# Patient Record
Sex: FEMALE | Race: WHITE | URBAN - METROPOLITAN AREA
[De-identification: names, ages, dates, MRNs, and addresses within clinical notes are randomized per-mention and may not be internally consistent; named-entity substitution may affect disease eponyms.]

---

## 2022-10-20 ENCOUNTER — TELEPHONE (OUTPATIENT)
Dept: SURGERY | Age: 67
End: 2022-10-20

## 2022-10-20 ENCOUNTER — OFFICE VISIT (OUTPATIENT)
Dept: SURGERY | Age: 67
End: 2022-10-20
Payer: MEDICARE

## 2022-10-20 ENCOUNTER — DOCUMENTATION ONLY (OUTPATIENT)
Dept: SURGERY | Age: 67
End: 2022-10-20

## 2022-10-20 VITALS — WEIGHT: 145 LBS | BODY MASS INDEX: 24.75 KG/M2 | HEIGHT: 64 IN

## 2022-10-20 DIAGNOSIS — C50.411 MALIGNANT NEOPLASM OF UPPER-OUTER QUADRANT OF RIGHT FEMALE BREAST, UNSPECIFIED ESTROGEN RECEPTOR STATUS (HCC): Primary | ICD-10-CM

## 2022-10-20 PROCEDURE — 99204 OFFICE O/P NEW MOD 45 MIN: CPT | Performed by: SURGERY

## 2022-10-20 PROCEDURE — 1123F ACP DISCUSS/DSCN MKR DOCD: CPT | Performed by: SURGERY

## 2022-10-20 PROCEDURE — 76642 ULTRASOUND BREAST LIMITED: CPT | Performed by: SURGERY

## 2022-10-20 RX ORDER — LISINOPRIL 20 MG/1
20 TABLET ORAL
COMMUNITY

## 2022-10-20 RX ORDER — CELECOXIB 200 MG/1
CAPSULE ORAL
COMMUNITY
Start: 2022-08-24 | End: 2022-10-25

## 2022-10-20 NOTE — PROGRESS NOTES
HISTORY OF PRESENT ILLNESS  Natasha Mcfadden is a 75 y.o. female. HPI NEW patient consult referred by Dr. Enrqiue Morin for RIGHT breast cancer found on screening mammogram.  She had stage 1 LEFT breast cancer 12 years ago. Her breast is bruised after the biopsy. Moh's surgery 10/2022  Pterygium removed 2022 (before cataract surgery)  Cataract surgery 2022 LEFT lumpectomy 9mm tubular invasive ductal carcinoma, grade 1, SN-, %. OR neg, Her 2 -,  XRT. Tamoxifen x 2 yrs     10/2022 RIGHT UOQ, invasive ductal carcinoma, grade 2, ER %. PR51-60%, Her 2 equivocal, ki  *%    Family History:   Maternal aunt had breast cancer age 54    [facility] Results (most recent):  Results from Abstract encounter on 10/18/22    [facility] MAMMOGRAM SCREENING BILATERAL   [address]  mammogram/US RIGHT 9:00 4cfn 1cm irregular mass    Past Medical History:   Diagnosis Date    Asthma     exercise induced    Cancer ([facility] [address]     LEFT breast    Mitral valve prolapse     Pneumonia eosinophilous      Past Surgical History:   Procedure Laterality Date    HX BREAST LUMPECTOMY  2010    LEFT    HX HEENT      RIGHT Pterygium    HX LIPOMA RESECTION      left leg    HX ORTHOPAEDIC  1980    Ganglion cyst      OB History          1    Para   0    Term                AB   1    Living             SAB        IAB        Ectopic        Molar        Multiple        Live Births              Obstetric Comments   Menarche:  14. LMP: 54.  # of Children:  0. Age at Delivery of First Child:  na.   Hysterectomy/oophorectomy:  NO/NO. Breast Bx:  1. Hx of Breast Feeding:  na. BCP:  In past. Hormone therapy:  yes.              Family History   Problem Relation Age of Onset    Diabetes Mother     Hypertension Father     Stroke Father     Cancer Paternal Aunt 61        Breast cancer     Social History     Tobacco Use    Smoking status: Never    Smokeless tobacco: Never   Substance Use Topics    Alcohol use: Yes      Prior to Admission medications    Medication Sig Start Date End Date Taking? Authorizing Provider   lisinopriL (PRINIVIL, ZESTRIL) 20 mg tablet Take  by mouth daily. Yes Provider, Historical   amLODIPine benzoate 1 mg/mL susp    Yes Provider, Historical   celecoxib (CELEBREX) 200 mg capsule  8/24/22  Yes Provider, Historical      No Known Allergies         ROS    Physical Exam  Constitutional:       Appearance: She is well-developed. Cardiovascular:      Rate and Rhythm: Normal rate and regular rhythm. Heart sounds: Normal heart sounds. Pulmonary:      Effort: Pulmonary effort is normal.      Breath sounds: Normal breath sounds. Chest:   Breasts:     Breasts are symmetrical.      Right: Mass and skin change present. No swelling, inverted nipple, nipple discharge or tenderness. Left: No swelling, inverted nipple, mass, nipple discharge, skin change or tenderness. Lymphadenopathy:      Upper Body:      Right upper body: No supraclavicular or axillary adenopathy. Left upper body: No supraclavicular or axillary adenopathy. Skin:     General: Skin is warm and dry. Neurological:      Mental Status: She is alert and oriented to person, place, and time. BREAST ULTRASOUND, Preop planning  Indication:preop planning  right Breast 9 o'clock   Technique: The area was scanned using a high-frequency linear-array near-field transducer  Findings: 1cm irregular mass without dropout  Impression: Biopsy site visible with ultrasound  Disposition:  Will schedule lumpectomy with sentinel lymph node biopsy   ASSESSMENT and PLAN    ICD-10-CM ICD-9-CM    1. Malignant neoplasm of upper-outer quadrant of right female breast, unspecified estrogen receptor status ([facility]  C50.411 174.4       Total time spent on chart review and patient visit: 45 minutes    RIGHT breast cancer found on screening mammogram  Biomarkers pending  If ER+ and Her2 neg will proceed with surgery next week. Discussed MRI.   Small tumor found on 3d mammogram.  Pt is comfortable without breast MRI.

## 2022-10-20 NOTE — H&P (VIEW-ONLY)
HISTORY OF PRESENT ILLNESS  Kain Short is a 75 y.o. female. HPI NEW patient consult referred by Dr. Deepika Betancourt for RIGHT breast cancer found on screening mammogram.  She had stage 1 LEFT breast cancer 12 years ago. Her breast is bruised after the biopsy. Moh's surgery 10/2022  Pterygium removed 2022 (before cataract surgery)  Cataract surgery 2022 LEFT lumpectomy 9mm tubular invasive ductal carcinoma, grade 1, SN-, %. NH neg, Her 2 -,  XRT. Tamoxifen x 2 yrs     10/2022 RIGHT UOQ, invasive ductal carcinoma, grade 2, ER %. PR51-60%, Her 2 equivocal, ki  *%    Family History:   Maternal aunt had breast cancer age 54    [facility] Results (most recent):  Results from Abstract encounter on 10/18/22    [facility] MAMMOGRAM SCREENING BILATERAL   [address]  mammogram/US RIGHT 9:00 4cfn 1cm irregular mass    Past Medical History:   Diagnosis Date    Asthma     exercise induced    Cancer ([facility] [address]     LEFT breast    Mitral valve prolapse     Pneumonia eosinophilous      Past Surgical History:   Procedure Laterality Date    HX BREAST LUMPECTOMY  2010    LEFT    HX HEENT      RIGHT Pterygium    HX LIPOMA RESECTION      left leg    HX ORTHOPAEDIC  1980    Ganglion cyst      OB History          1    Para   0    Term                AB   1    Living             SAB        IAB        Ectopic        Molar        Multiple        Live Births              Obstetric Comments   Menarche:  14. LMP: 54.  # of Children:  0. Age at Delivery of First Child:  na.   Hysterectomy/oophorectomy:  NO/NO. Breast Bx:  1. Hx of Breast Feeding:  na. BCP:  In past. Hormone therapy:  yes.              Family History   Problem Relation Age of Onset    Diabetes Mother     Hypertension Father     Stroke Father     Cancer Paternal Aunt 61        Breast cancer     Social History     Tobacco Use    Smoking status: Never    Smokeless tobacco: Never   Substance Use Topics    Alcohol use: Yes      Prior to Admission medications    Medication Sig Start Date End Date Taking? Authorizing Provider   lisinopriL (PRINIVIL, ZESTRIL) 20 mg tablet Take  by mouth daily. Yes Provider, Historical   amLODIPine benzoate 1 mg/mL susp    Yes Provider, Historical   celecoxib (CELEBREX) 200 mg capsule  8/24/22  Yes Provider, Historical      No Known Allergies         ROS    Physical Exam  Constitutional:       Appearance: She is well-developed. Cardiovascular:      Rate and Rhythm: Normal rate and regular rhythm. Heart sounds: Normal heart sounds. Pulmonary:      Effort: Pulmonary effort is normal.      Breath sounds: Normal breath sounds. Chest:   Breasts:     Breasts are symmetrical.      Right: Mass and skin change present. No swelling, inverted nipple, nipple discharge or tenderness. Left: No swelling, inverted nipple, mass, nipple discharge, skin change or tenderness. Lymphadenopathy:      Upper Body:      Right upper body: No supraclavicular or axillary adenopathy. Left upper body: No supraclavicular or axillary adenopathy. Skin:     General: Skin is warm and dry. Neurological:      Mental Status: She is alert and oriented to person, place, and time. BREAST ULTRASOUND, Preop planning  Indication:preop planning  right Breast 9 o'clock   Technique: The area was scanned using a high-frequency linear-array near-field transducer  Findings: 1cm irregular mass without dropout  Impression: Biopsy site visible with ultrasound  Disposition:  Will schedule lumpectomy with sentinel lymph node biopsy   ASSESSMENT and PLAN    ICD-10-CM ICD-9-CM    1. Malignant neoplasm of upper-outer quadrant of right female breast, unspecified estrogen receptor status ([facility]  C50.411 174.4       Total time spent on chart review and patient visit: 45 minutes    RIGHT breast cancer found on screening mammogram  Biomarkers pending  If ER+ and Her2 neg will proceed with surgery next week. Discussed MRI.   Small tumor found on 3d mammogram.  Pt is comfortable without breast MRI.

## 2022-10-20 NOTE — PROGRESS NOTES
[facility] and Guillaume was called to was called to retrieve hormone receptors for this patient. I was able to reach [facility] but no nurse was available . Spoke to [facility] and he will have the nurse call back.

## 2022-10-20 NOTE — H&P (VIEW-ONLY)
HISTORY OF PRESENT ILLNESS  Alma Alexander is a 75 y.o. female. HPI NEW patient consult referred by Dr. Nikolas Akbar for RIGHT breast cancer found on screening mammogram.  She had stage 1 LEFT breast cancer 12 years ago. Her breast is bruised after the biopsy. Moh's surgery 10/2022  Pterygium removed 2022 (before cataract surgery)  Cataract surgery 2022 LEFT lumpectomy 9mm tubular invasive ductal carcinoma, grade 1, SN-, %. UT neg, Her 2 -,  XRT. Tamoxifen x 2 yrs     10/2022 RIGHT UOQ, invasive ductal carcinoma, grade 2, ER %. PR51-60%, Her 2 equivocal, ki  *%    Family History:   Maternal aunt had breast cancer age 54    [facility] Results (most recent):  Results from Abstract encounter on 10/18/22    [facility] MAMMOGRAM SCREENING BILATERAL   [address]  mammogram/US RIGHT 9:00 4cfn 1cm irregular mass    Past Medical History:   Diagnosis Date    Asthma     exercise induced    Cancer ([facility] [address]     LEFT breast    Mitral valve prolapse     Pneumonia eosinophilous      Past Surgical History:   Procedure Laterality Date    HX BREAST LUMPECTOMY  2010    LEFT    HX HEENT      RIGHT Pterygium    HX LIPOMA RESECTION      left leg    HX ORTHOPAEDIC  1980    Ganglion cyst      OB History          1    Para   0    Term                AB   1    Living             SAB        IAB        Ectopic        Molar        Multiple        Live Births              Obstetric Comments   Menarche:  14. LMP: 54.  # of Children:  0. Age at Delivery of First Child:  na.   Hysterectomy/oophorectomy:  NO/NO. Breast Bx:  1. Hx of Breast Feeding:  na. BCP:  In past. Hormone therapy:  yes.              Family History   Problem Relation Age of Onset    Diabetes Mother     Hypertension Father     Stroke Father     Cancer Paternal Aunt 61        Breast cancer     Social History     Tobacco Use    Smoking status: Never    Smokeless tobacco: Never   Substance Use Topics    Alcohol use: Yes      Prior to Admission medications    Medication Sig Start Date End Date Taking? Authorizing Provider   lisinopriL (PRINIVIL, ZESTRIL) 20 mg tablet Take  by mouth daily. Yes Provider, Historical   amLODIPine benzoate 1 mg/mL susp    Yes Provider, Historical   celecoxib (CELEBREX) 200 mg capsule  8/24/22  Yes Provider, Historical      No Known Allergies         ROS    Physical Exam  Constitutional:       Appearance: She is well-developed. Cardiovascular:      Rate and Rhythm: Normal rate and regular rhythm. Heart sounds: Normal heart sounds. Pulmonary:      Effort: Pulmonary effort is normal.      Breath sounds: Normal breath sounds. Chest:   Breasts:     Breasts are symmetrical.      Right: Mass and skin change present. No swelling, inverted nipple, nipple discharge or tenderness. Left: No swelling, inverted nipple, mass, nipple discharge, skin change or tenderness. Lymphadenopathy:      Upper Body:      Right upper body: No supraclavicular or axillary adenopathy. Left upper body: No supraclavicular or axillary adenopathy. Skin:     General: Skin is warm and dry. Neurological:      Mental Status: She is alert and oriented to person, place, and time. BREAST ULTRASOUND, Preop planning  Indication:preop planning  right Breast 9 o'clock   Technique: The area was scanned using a high-frequency linear-array near-field transducer  Findings: 1cm irregular mass without dropout  Impression: Biopsy site visible with ultrasound  Disposition:  Will schedule lumpectomy with sentinel lymph node biopsy   ASSESSMENT and PLAN    ICD-10-CM ICD-9-CM    1. Malignant neoplasm of upper-outer quadrant of right female breast, unspecified estrogen [facility]  C50.411 174.4       Total time spent on chart review and patient visit: 45 minutes    RIGHT breast cancer found on screening mammogram  Biomarkers pending  If ER+ and Her2 neg will proceed with surgery next week. Discussed MRI.   Small tumor found on 3d mammogram.  Pt is comfortable without breast MRI.

## 2022-10-21 ENCOUNTER — TELEPHONE (OUTPATIENT)
Dept: SURGERY | Age: 67
End: 2022-10-21

## 2022-10-21 ENCOUNTER — DOCUMENTATION ONLY (OUTPATIENT)
Dept: SURGERY | Age: 67
End: 2022-10-21

## 2022-10-21 NOTE — PROGRESS NOTES
I called the patient to let her know we were scheduling the surgery on 10/26/2022 at [facility]. I told her a nurse from [facility] pre admission testing would be calling her to go over her history. I will be calling back also on Monday with final instructions. She appreciated the phone call.

## 2022-10-21 NOTE — PERIOP NOTES
MD Chanel Medina, MIAN Mojica   I will always support your medical decision. PAT phone call is a good idea. Thanks   Deangelo Sanchez           Previous Messages     ----- Message -----   From: Venkat Coles   Sent: 10/21/2022  10:34 AM EDT   To: Deangelo Sanchez MD   Subject: FW: Possible PAT phone call interview             ALFONZO,     Please advise this medical question. Thanks,   Ferny Minor     ----- Message -----   From: Zeynep Cox RN   Sent: 10/21/2022   9:53 AM EDT   To: Karen Shah   Subject: Possible PAT phone call interview                 [place] morning. I believe this patient was seen by Dr. Oswaldo Golden yesterday (Oct. 20). Based on Dr. Oswaldo Golden' assessment findings and date of surgery, do you think it would be prudent to make her a PAT phone call? If not, I will work to schedule her for PAT on Monday or Tuesday. Thank you so much! Have a great day!      Errol CHRISTINE [facility] PAT)

## 2022-10-21 NOTE — TELEPHONE ENCOUNTER
Rec'd breast cancer receptors from [facility], ER %, SC 51-60%, HER2 equivocal. Called and let patient know at request of Dr. Brea Salgado. I told her FISH can take a week or two to come back because [facility] usually sends to [facility]. Will continue to follow FISH results.

## 2022-10-24 ENCOUNTER — DOCUMENTATION ONLY (OUTPATIENT)
Dept: SURGERY | Age: 67
End: 2022-10-24

## 2022-10-24 NOTE — PROGRESS NOTES
Verbally informed patient of surgery    [facility]     Date: 10/26/2022 @ 8:45 AM      Arrive:6:45 AM  report to 2nd floor outpatient registration day of surgery.     PAT: nurse will call to go over her history     Arrive: nurse will give information that is needed. : report to 2nd floor volunteer desk, take a right off the elevator      Gave following instructions:      NPO after Midnight the night before    Shower in AM, no lotion, deodorant, powder,perfume or makeup    Will need  morning of the surgery

## 2022-10-25 ENCOUNTER — ANESTHESIA EVENT (OUTPATIENT)
Dept: SURGERY | Age: 67
End: 2022-10-25
Payer: MEDICARE

## 2022-10-25 NOTE — PERIOP NOTES
[address], [address] [facility]   MAIN OR                                  [phone]   MAIN PRE OP                          [phone]                                                                                AMBULATORY PRE OP          [phone]  PRE-ADMISSION TESTING    [phone]   Surgery Date: Wednesday 10/26/22       Is surgery arrival time given by surgeon? YES    If NO, [facility] staff will call you between 3 and 7pm the day before your surgery with your arrival time. (If your surgery is on a Monday, we will call you the Friday before.)    Call [phone] after 7pm Monday-Friday if you did not receive this call.     INSTRUCTIONS BEFORE YOUR SURGERY   When You  Arrive Arrive at the 2nd [address] [facility] on the day of your surgery  Have your insurance card, photo ID, and any copayment (if needed)   Food   and   Drink NO food or drink after midnight the night before surgery    This means NO water, gum, mints, coffee, juice, etc.  No alcohol (beer, wine, liquor) 24 hours before and after surgery   Medications to   TAKE   Morning of Surgery MEDICATIONS TO TAKE THE MORNING OF SURGERY WITH A SIP OF WATER:   Amlodipine   Medications  To  STOP      7 days before surgery Non-Steroidal anti-inflammatory Drugs (NSAID's): for example, Ibuprofen (Advil, Motrin), Naproxen (Aleve)  Aspirin, if taking for pain   Herbal supplements, vitamins, and fish oil  Other:  (Pain medications not listed above, including Tylenol may be taken)   Blood  Thinners N/a   Bathing Clothing  Jewelry  Valuables     If you shower the morning of surgery, please do not apply anything to your skin (lotions, powders, deodorant, or makeup, especially mascara)  Do not shave or trim anywhere 24 hours before surgery  Wear your hair loose or down; no pony-tails, buns, or metal hair clips  Wear loose, comfortable, clean clothes  Wear glasses instead of contacts  Leave money, valuables, and jewelry, including body piercings, at home     Going Home - or Spending the Night SAME-DAY SURGERY: You must have a responsible adult drive you home and stay with you 24 hours after surgery  ADMITS: If your doctor is keeping you in the hospital after surgery, leave personal belongings/luggage in your car until you have a hospital room number. Hospital discharge time is 12 noon  Drivers must be here before 12 noon unless you are told differently   Special Instructions        Follow all instructions so your surgery wont be cancelled. Please, be on time. If a situation occurs and you are delayed the day of surgery, call [phone]. If your physical condition changes (like a fever, cold, flu, etc.) call your surgeon. Home medication(s) reviewed and verified via   PHONE    during PAT appointment. The patient was contacted by  PHONE     The patient verbalizes understanding of all instructions and   DOES NOT   need reinforcement.

## 2022-10-26 ENCOUNTER — TELEPHONE (OUTPATIENT)
Dept: SURGERY | Age: 67
End: 2022-10-26

## 2022-10-26 ENCOUNTER — ANESTHESIA (OUTPATIENT)
Dept: SURGERY | Age: 67
End: 2022-10-26
Payer: MEDICARE

## 2022-10-26 ENCOUNTER — HOSPITAL ENCOUNTER (OUTPATIENT)
Age: 67
Setting detail: OUTPATIENT SURGERY
Discharge: HOME | End: 2022-10-26
Attending: SURGERY | Admitting: SURGERY
Payer: MEDICARE

## 2022-10-26 VITALS
HEIGHT: 64 IN | OXYGEN SATURATION: 95 % | WEIGHT: 145 LBS | TEMPERATURE: 97.2 F | SYSTOLIC BLOOD PRESSURE: 145 MMHG | BODY MASS INDEX: 24.75 KG/M2 | RESPIRATION RATE: 20 BRPM | HEART RATE: 64 BPM | DIASTOLIC BLOOD PRESSURE: 75 MMHG

## 2022-10-26 DIAGNOSIS — C50.411 MALIGNANT NEOPLASM OF UPPER-OUTER QUADRANT OF RIGHT FEMALE BREAST, UNSPECIFIED ESTROGEN RECEPTOR STATUS (HCC): ICD-10-CM

## 2022-10-26 DIAGNOSIS — C50.411 MALIGNANT NEOPLASM OF UPPER-OUTER QUADRANT OF RIGHT FEMALE BREAST, UNSPECIFIED ESTROGEN RECEPTOR STATUS (HCC): Primary | ICD-10-CM

## 2022-10-26 PROCEDURE — 77030040361 HC SLV COMPR DVT MDII -B

## 2022-10-26 PROCEDURE — 74011000636 HC RX REV CODE- 636: Performed by: SURGERY

## 2022-10-26 PROCEDURE — 74011000272 HC RX REV CODE- 272: Performed by: SURGERY

## 2022-10-26 PROCEDURE — 76210000046 HC AMBSU PH II REC FIRST 0.5 HR: Performed by: SURGERY

## 2022-10-26 PROCEDURE — 77030040922 HC BLNKT HYPOTHRM STRY -A

## 2022-10-26 PROCEDURE — 76030000001 HC AMB SURG OR TIME 1 TO 1.5: Performed by: SURGERY

## 2022-10-26 PROCEDURE — 38525 BIOPSY/REMOVAL LYMPH NODES: CPT | Performed by: SURGERY

## 2022-10-26 PROCEDURE — 74011250636 HC RX REV CODE- 250/636: Performed by: NURSE ANESTHETIST, CERTIFIED REGISTERED

## 2022-10-26 PROCEDURE — 76998 US GUIDE INTRAOP: CPT | Performed by: SURGERY

## 2022-10-26 PROCEDURE — 38900 IO MAP OF SENT LYMPH NODE: CPT | Performed by: SURGERY

## 2022-10-26 PROCEDURE — 88307 TISSUE EXAM BY PATHOLOGIST: CPT

## 2022-10-26 PROCEDURE — 74011250636 HC RX REV CODE- 250/636: Performed by: ANESTHESIOLOGY

## 2022-10-26 PROCEDURE — 74011000250 HC RX REV CODE- 250: Performed by: SURGERY

## 2022-10-26 PROCEDURE — 2709999900 HC NON-CHARGEABLE SUPPLY: Performed by: SURGERY

## 2022-10-26 PROCEDURE — 76210000034 HC AMBSU PH I REC 0.5 TO 1 HR: Performed by: SURGERY

## 2022-10-26 PROCEDURE — 19301 PARTIAL MASTECTOMY: CPT | Performed by: SURGERY

## 2022-10-26 PROCEDURE — 77030002996 HC SUT SLK J&J -A: Performed by: SURGERY

## 2022-10-26 PROCEDURE — 76060000062 HC AMB SURG ANES 1 TO 1.5 HR: Performed by: SURGERY

## 2022-10-26 RX ORDER — PROPOFOL 10 MG/ML
INJECTION, EMULSION INTRAVENOUS
Status: DISCONTINUED | OUTPATIENT
Start: 2022-10-26 | End: 2022-10-26 | Stop reason: HOSPADM

## 2022-10-26 RX ORDER — PROPOFOL 10 MG/ML
INJECTION, EMULSION INTRAVENOUS AS NEEDED
Status: DISCONTINUED | OUTPATIENT
Start: 2022-10-26 | End: 2022-10-26 | Stop reason: HOSPADM

## 2022-10-26 RX ORDER — MIDAZOLAM HYDROCHLORIDE 1 MG/ML
INJECTION, SOLUTION INTRAMUSCULAR; INTRAVENOUS AS NEEDED
Status: DISCONTINUED | OUTPATIENT
Start: 2022-10-26 | End: 2022-10-26 | Stop reason: HOSPADM

## 2022-10-26 RX ORDER — FENTANYL CITRATE 50 UG/ML
INJECTION, SOLUTION INTRAMUSCULAR; INTRAVENOUS AS NEEDED
Status: DISCONTINUED | OUTPATIENT
Start: 2022-10-26 | End: 2022-10-26 | Stop reason: HOSPADM

## 2022-10-26 RX ORDER — BUPIVACAINE HYDROCHLORIDE AND EPINEPHRINE 5; 5 MG/ML; UG/ML
30 INJECTION, SOLUTION EPIDURAL; INTRACAUDAL; PERINEURAL
Status: COMPLETED | OUTPATIENT
Start: 2022-10-26 | End: 2022-10-26

## 2022-10-26 RX ORDER — LIDOCAINE HYDROCHLORIDE 10 MG/ML
0.1 INJECTION, SOLUTION EPIDURAL; INFILTRATION; INTRACAUDAL; PERINEURAL AS NEEDED
Status: DISCONTINUED | OUTPATIENT
Start: 2022-10-26 | End: 2022-10-26 | Stop reason: HOSPADM

## 2022-10-26 RX ORDER — HYDROMORPHONE HYDROCHLORIDE 1 MG/ML
.25-1 INJECTION, SOLUTION INTRAMUSCULAR; INTRAVENOUS; SUBCUTANEOUS
Status: DISCONTINUED | OUTPATIENT
Start: 2022-10-26 | End: 2022-10-26 | Stop reason: HOSPADM

## 2022-10-26 RX ORDER — SODIUM CHLORIDE, SODIUM LACTATE, POTASSIUM CHLORIDE, CALCIUM CHLORIDE 600; 310; 30; 20 MG/100ML; MG/100ML; MG/100ML; MG/100ML
125 INJECTION, SOLUTION INTRAVENOUS CONTINUOUS
Status: DISCONTINUED | OUTPATIENT
Start: 2022-10-26 | End: 2022-10-26 | Stop reason: HOSPADM

## 2022-10-26 RX ORDER — ONDANSETRON 2 MG/ML
INJECTION INTRAMUSCULAR; INTRAVENOUS AS NEEDED
Status: DISCONTINUED | OUTPATIENT
Start: 2022-10-26 | End: 2022-10-26 | Stop reason: HOSPADM

## 2022-10-26 RX ORDER — SODIUM CHLORIDE, SODIUM LACTATE, POTASSIUM CHLORIDE, CALCIUM CHLORIDE 600; 310; 30; 20 MG/100ML; MG/100ML; MG/100ML; MG/100ML
75 INJECTION, SOLUTION INTRAVENOUS CONTINUOUS
Status: DISCONTINUED | OUTPATIENT
Start: 2022-10-26 | End: 2022-10-26 | Stop reason: HOSPADM

## 2022-10-26 RX ORDER — DIPHENHYDRAMINE HYDROCHLORIDE 50 MG/ML
12.5 INJECTION, SOLUTION INTRAMUSCULAR; INTRAVENOUS AS NEEDED
Status: DISCONTINUED | OUTPATIENT
Start: 2022-10-26 | End: 2022-10-26 | Stop reason: HOSPADM

## 2022-10-26 RX ORDER — ONDANSETRON 2 MG/ML
4 INJECTION INTRAMUSCULAR; INTRAVENOUS AS NEEDED
Status: DISCONTINUED | OUTPATIENT
Start: 2022-10-26 | End: 2022-10-26 | Stop reason: HOSPADM

## 2022-10-26 RX ADMIN — FENTANYL CITRATE 25 MCG: 50 INJECTION, SOLUTION INTRAMUSCULAR; INTRAVENOUS at 09:08

## 2022-10-26 RX ADMIN — ONDANSETRON HYDROCHLORIDE 4 MG: 2 SOLUTION INTRAMUSCULAR; INTRAVENOUS at 09:06

## 2022-10-26 RX ADMIN — MIDAZOLAM HYDROCHLORIDE 2 MG: 1 INJECTION, SOLUTION INTRAMUSCULAR; INTRAVENOUS at 08:35

## 2022-10-26 RX ADMIN — HYDROMORPHONE HYDROCHLORIDE 0.5 MG: 1 INJECTION, SOLUTION INTRAMUSCULAR; INTRAVENOUS; SUBCUTANEOUS at 10:10

## 2022-10-26 RX ADMIN — SODIUM CHLORIDE, POTASSIUM CHLORIDE, SODIUM LACTATE AND CALCIUM CHLORIDE 75 ML/HR: 600; 310; 30; 20 INJECTION, SOLUTION INTRAVENOUS at 07:42

## 2022-10-26 RX ADMIN — PROPOFOL 50 MCG/KG/MIN: 10 INJECTION, EMULSION INTRAVENOUS at 08:43

## 2022-10-26 RX ADMIN — FENTANYL CITRATE 25 MCG: 50 INJECTION, SOLUTION INTRAMUSCULAR; INTRAVENOUS at 08:50

## 2022-10-26 RX ADMIN — PROPOFOL 30 MG: 10 INJECTION, EMULSION INTRAVENOUS at 08:44

## 2022-10-26 RX ADMIN — FENTANYL CITRATE 50 MCG: 50 INJECTION, SOLUTION INTRAMUSCULAR; INTRAVENOUS at 08:43

## 2022-10-26 NOTE — DISCHARGE INSTRUCTIONS
Discharge Instructions from Dr. Emilee Vickers    Patient Discharge Instructions    Vincenza Schilder / [id number] : 1955    Admitted 10/26/2022 Discharged: 10/26/2022   What to do at Home  Diet:Regular  Activity: As tolerated. No driving if taking pain medication. Okay to shower or take a bath. You may chose to wear a bra to sleep in for extra support for the next few days. Pain control: Ice pack 20 minutes of every hour until you go to bed tonight. You may use over-the-counter medication as needed (acetominophen, aspirin, ibuprofen). Tomorrow you may put a heat pack on the breast.  Dressing: The skin glue is waterproof. It is okay to wash normally at this site. If the glue is still present after 10 days you should peel it off. Follow up: If needed, call the office to make an appointment. [phone]. I will call with results within one week. [address] [facility], [address]  Problems/Questions: Call Mahsa Lares MD on my cell phone. [phone]    Cold Therapy Instructions    Your nurse will provide a cold therapy wrap based upon your surgery, need, and your doctor's orders. INSTRUCTIONS FOR USE:  All cooling wraps produce sustained periods of intense cold. NEVER PLACE PAD ON BARE SKIN OR HAVE CONTACT WITH BARE SKIN  Always Use An Insulating Barrier. Tissue injury can occur if these devices are used improperly. Follow your surgeons instructions carefully regarding the frequency, duration and breaks from cold therapy, and the total length of treatment. Check under the pad barrier every 2 hours for skin injury (see below.)      SIGNS OF SKIN INJURY:  STOP USE AND CONTACT YOUR SURGEON if any of the following occur: Increased pain, burning, increased swelling, itching, blisters, increased redness, discoloration, welts, or other change in skin condition. INDICATIONS:  Back, Hip, Knee or Shoulder Surgery and Post-Operative Treatment; Trauma;  Orthopedic Rehabilitation      CONTRAINDICATIONS:  Cold therapy should not be used by persons with Diabetes, Raynaud's or other vasospastic disease, cold hypersensititvity, or compromised local circulation. Certain medical conditions make cold-induced injury more likely. Please consult with your healthcare provider before use. DISCHARGE SUMMARY from your Nurse      PATIENT INSTRUCTIONS    After general anesthesia or intravenous sedation, for 24 hours or while taking prescription Narcotics:  Limit your activities  Do not drive and operate hazardous machinery  Do not make important personal or business decisions  Do  not drink alcoholic beverages  If you have not urinated within 8 hours after discharge, please contact your surgeon on call. Report the following to your surgeon:  Excessive pain, swelling, redness or odor of or around the surgical area  Temperature over 100.5  Nausea and vomiting lasting longer than 4 hours or if unable to take medications  Any signs of decreased circulation or nerve impairment to extremity: change in color, persistent  numbness, tingling, coldness or increase pain  Any questions      GOOD HELP TO FIGHT AN INFECTION  Here are a few tip to help reduce the chance of getting an infection after surgery:  Wash Your Hands  Good handwashing is the most important thing you and your caregiver can do. Wash before and after caring for any wounds. Dry your hand with a clean towel. Wash with soap and water for at least 20 seconds. A TIP: sing the \"Happy Birthday\" song through one time while washing to help with the timing. Use a hand  in between washings. Shower  When your surgeon says it is OK to take a shower, use a new bar of antibacterial soap (if that is what you use, and keep that bar of soap ONLY for your use), or antibacterial body wash. Use a clean wash cloth or sponge when you bathe.   Dry off with a clean towel  after every bath - be careful around any wounds, skin staples, sutures or surgical glue over/on wounds. Do not enter swimming pools, hot tubs, lakes, rivers and/or ocean until wounds are healed and your doctor/surgeon says it is OK. Use Clean Sheets  Sleep on freshly laundered sheets after your surgery. Keep the surgery site covered with a clean, dry bandage (if instructed to do so). If the bandage becomes soiled, reapply a new, dry, clean bandage. Do not allow pets to sleep with you while your wound is healing. Lifestyle Modification and Controlling Your Blood Sugar  Smoking slows wound healing. Stop smoking and limit exposure to second-hand smoke. High blood sugar slows wound healing. Eat a well-balanced diet to provide proper nutrition while healing  Monitor your blood sugar (if you are a diabetic) and take your medications as you are suppose to so you can control you blood sugar after surgery. MEDICATION AND   SIDE EFFECT GUIDE    The [facility] MEDICATION AND SIDE EFFECT GUIDE was provided to the PATIENT AND CARE PROVIDER. Information provided includes instruction about drug purpose and common side effects for the following medications: No prescriptions          These are general instructions for a healthy lifestyle:    *   Please give a list of your current medications to your Primary Care Provider. *   Please update this list whenever your medications are discontinued, doses are changed, or new medications (including over-the-counter products) are added. *   Please carry medication information at all times in case of emergency situations. About Smoking  No smoking / No tobacco products  Avoid exposure to second hand smoke     Surgeon General's Warning:  Quitting smoking now greatly reduces serious risk to your health. Obesity, smoking, and sedentary lifestyle greatly increases your risk for illness and disease. A healthy diet, regular physical exercise & weight monitoring are important for maintaining a healthy lifestyle.       Congestive Heart Failure  You may be retaining fluid if you have a history of heart failure or if you experience any of the following symptoms:  Weight gain of 3 pounds or more overnight or 5 pounds in a week, increased swelling in your hands or feet or shortness of breath while lying flat in bed. Please call your doctor as soon as you notice any of these symptoms; do not wait until your next office visit. Recognize signs and symptoms of STROKE:  F -  Face looks uneven  A -  Arms unable to move or move evenly  S -  Speech slurred or non-existent  T -  Time-call 911 as soon as signs and symptoms begin-DO NOT go          back to bed or wait to see if you get better-TIME IS BRAIN. Warning Signs of HEART ATTACK   Call 911 if you have these symptoms:    Chest discomfort. Most heart attacks involve discomfort in the center of the chest that lasts more than a few minutes, or that goes away and comes back. It can feel like uncomfortable pressure, squeezing, fullness, or pain. Discomfort in other areas of the upper body. Symptoms can include pain or discomfort in one or both arms, the back, neck, jaw, or stomach. Shortness of breath with or without chest discomfort. Other signs may include breaking out in a cold sweat, nausea, or lightheadedness. Don't wait more than five minutes to call 911 - MINUTES MATTER! Fast action can save your life. Calling 911 is almost always the fastest way to get lifesaving treatment. Emergency Medical Services staff can begin treatment when they arrive -- up to an hour sooner than if someone gets to the hospital by car. Learning About Coronavirus [phone])  Coronavirus [phone]): Overview  What is coronavirus (COVID-19)? The coronavirus disease (COVID-19) is caused by a virus. It is an illness that was first found in [place], [place], in December 2019. It has since spread worldwide. The virus can cause fever, cough, and trouble breathing.  In severe cases, it can cause pneumonia and make it hard to breathe without help. It can cause death. Coronaviruses are a large group of viruses. They cause the common cold. They also cause more serious illnesses like Middle East respiratory syndrome (MERS) and severe acute respiratory syndrome (SARS). COVID-19 is caused by a novel coronavirus. That means it's a new type that has not been seen in people before. This virus spreads person-to-person through droplets from coughing and sneezing. It can also spread when you are close to someone who is infected. And it can spread when you touch something that has the virus on it, such as a doorknob or a tabletop. What can you do to protect yourself from coronavirus (COVID-19)? The best way to protect yourself from getting sick is to: Avoid areas where there is an outbreak. Avoid contact with people who may be infected. Wash your hands often with soap or alcohol-based hand sanitizers. Avoid crowds and try to stay at least 6 feet away from other people. Wash your hands often, especially after you cough or sneeze. Use soap and water, and scrub for at least 20 seconds. If soap and water aren't available, use an alcohol-based hand . Avoid touching your mouth, nose, and eyes. What can you do to avoid spreading the virus to others? To help avoid spreading the virus to others:  Cover your mouth with a tissue when you cough or sneeze. Then throw the tissue in the trash. Use a disinfectant to clean things that you touch often. Stay home if you are sick or have been exposed to the virus. Don't go to school, work, or public areas. And don't use public transportation. If you are sick:  Leave your home only if you need to get medical care. But call the doctor's office first so they know you're coming. And wear a face mask, if you have one. If you have a face mask, wear it whenever you're around other people. It can help stop the spread of the virus when you cough or sneeze.   Clean and disinfect your home every day. Use household  and disinfectant wipes or sprays. Take special care to clean things that you grab with your hands. These include doorknobs, remote controls, phones, and handles on your refrigerator and microwave. And don't forget countertops, tabletops, bathrooms, and computer keyboards. When to call for help  Call 911 anytime you think you may need emergency care. For example, call if:  You have severe trouble breathing. (You can't talk at all.)  You have constant chest pain or pressure. You are severely dizzy or lightheaded. You are confused or can't think clearly. Your face and lips have a blue color. You pass out (lose consciousness) or are very hard to wake up. Call your doctor now if you develop symptoms such as:  Shortness of breath. Fever. Cough. If you need to get care, call ahead to the doctor's office for instructions before you go. Make sure you wear a face mask, if you have one, to prevent exposing other people to the virus. Where can you get the latest information? The following health organizations are tracking and studying this virus. Their websites contain the most up-to-date information. Gudelia Mcdermott also learn what to do if you think you may have been exposed to the virus. U.S. Centers for Disease Control and Prevention (CDC): The CDC provides updated news about the disease and travel advice. The website also tells you how to prevent the spread of infection. www.cdc.gov  World Health Organization [facility]): WHO offers information about the virus outbreaks. WHO also has travel advice. www.who.int  Current as of: April 1, 2020               Content Version: 12.4  © 1993-6046 [employer].    Care instructions adapted under license by your healthcare professional. If you have questions about a medical condition or this instruction, always ask your healthcare professional. [address] any warranty or liability for your use of this information. The discharge information has been reviewed with the patient and spouse. Any questions and concerns from the patient and spouse have been addressed. The patient and spouse verbalized understanding. CONTENTS FOUND IN YOUR DISCHARGE ENVELOPE:  [x]     Surgeon and Hospital Discharge Instructions  [x]     [facility] Care Provider Card  [x]     Medication & Side Effect Guide            (your newly prescribed medications have been marked/highlighted showing the most common side effects from   the classes of drugs on your prescriptions)      The following personal items collected during your admission are returned to you:   Dental Appliance: Dental Appliances: None  Vision: Visual Aid: Glasses  Hearing Aid:    Jewelry: Jewelry: None  Clothing: Clothing: Shirt, Shorts, Footwear, Undergarments  Other Valuables:  Other Valuables: None

## 2022-10-26 NOTE — ANESTHESIA PREPROCEDURE EVALUATION
Relevant Problems   RESPIRATORY SYSTEM   (+) Asthma      CARDIOVASCULAR   (+) MVP (mitral valve prolapse)      PERSONAL HX & FAMILY HX OF CANCER   (+) Breast CA (HCC)       Anesthetic History   No history of anesthetic complications            Review of Systems / Medical History  Patient summary reviewed and pertinent labs reviewed    Pulmonary            Asthma : well controlled       Neuro/Psych   Within defined limits           Cardiovascular    Hypertension              Exercise tolerance: >4 METS     GI/Hepatic/Renal  Within defined limits              Endo/Other        Arthritis and cancer (right breast cancer, basal cell, left breast)     Other Findings              Physical Exam    Airway  Mallampati: II  TM Distance: 4 - 6 cm  Neck ROM: normal range of motion   Mouth opening: Normal     Cardiovascular    Rhythm: regular  Rate: normal         Dental    Dentition: Upper dentition intact and Lower dentition intact     Pulmonary  Breath sounds clear to auscultation               Abdominal         Other Findings            Anesthetic Plan    ASA: 2  Anesthesia type: MAC          Induction: Intravenous  Anesthetic plan and risks discussed with: Patient

## 2022-10-26 NOTE — OP NOTES
Jose Wright [address]  [address] [facility] [address]    Name: Ebony Lawson  : [id number]    Right Breast Lumpectomy with [place] Node Biopsy   Operative Report    Date of Surgery: 10/26/2022  Preoperative Diagnosis: Right breast cancer, UOQ  Postoperative Diagnosis: Right breast cancer  Surgeon: Dr Oswaldo Golden   Anesthesia: MAC  Procedure: Right breast lumpectomy with sentinel lymph node biopsy   Indication: RIGHT breast cancer    Procedure:  Ebony Lawson was brought into the operating room and placed supine on the table. She was sedated with IV sedation. 5cc of 1/2 strength methylene blue dye was injected in the RIGHT subareolar space and the UOQ, and the breast was massaged for 5 minutes. The RIGHT breast and axilla were then prepped and draped in the usual sterile fashion. 0.5% marcaine was used for local anesthesia. A 3cm incision was made in the lower axilla and the deep axillary cavity was accessed through an incision in the clavipectoral fascia. 2 blue lymph nodes were removed and sent to pathology. No other enlarged lymph nodes were palpated. The breast tumor was in the UOQ/10:00. The mass and associated clip were identified with ultrasound. A 4cm horizontal incision was made. Wide excision of the mass was performed using sharp dissection and electrocautery. The specimen was marked with sutures for orientation purposes, xrayed, and sent to pathology. Hemostasis was controlled with electrocautery. The breast tissue was reapproximated with multiple interrupted 3-0 Vicryl sutures. Both incisions were closed in 2 layers with 3-0 vicryl for the subcutaneous tissue and 4-0 Monocryl for the skin. The wounds were dressed with skin glue and the patient was awakened and taken to the recovery room. Sponge, needle and instrument counts were reported be correct. Findings:  clip in specimen.   2 blue sentinel nodes  Estimated Blood Loss: 10 cc  Specimens:   ID Type Source Tests Collected by Time Destination   1 : RIGHT AXILLARY LYMPH NODES Preservative Axilla  Martha Miguel MD 10/26/2022 1038 Pathology   2 : RIGHT BREAST LUMPECTOMY (2 LONG=LATERAL, 2 SHORT=SUPERIOR) Preservative Breast  Martha Miguel MD 10/26/2022 0469 Pathology      Complications: none  Implants: none  Assistant: Selma Silva SA    Signed:  Denzel Councilman, MD

## 2022-10-26 NOTE — INTERVAL H&P NOTE
Update History & Physical    The Patient's History and Physical of October 20, 2022   was reviewed with the patient and I examined the patient. There was no change. The surgical site was confirmed by the patient and me. Plan:  The risk, benefits, expected outcome, and alternative to the recommended procedure have been discussed with the patient. Patient understands and wants to proceed with the procedure.     Electronically signed by Waylon Stacy MD on 10/26/2022 at 7:18 AM

## 2022-10-26 NOTE — ANESTHESIA POSTPROCEDURE EVALUATION
Procedure(s):  RIGHT BREAST LUMPECTOMY AND RIGHT BREAST SENTINEL NODE BIOPSY WITH BLUE DYE. MAC    Anesthesia Post Evaluation      Multimodal analgesia: multimodal analgesia used between 6 hours prior to anesthesia start to PACU discharge  Patient location during evaluation: bedside  Patient participation: complete - patient participated  Level of consciousness: awake  Pain management: adequate  Airway patency: patent  Anesthetic complications: no  Cardiovascular status: acceptable  Respiratory status: acceptable  Hydration status: acceptable        INITIAL Post-op Vital signs:   Vitals Value Taken Time   /71 10/26/22 1030   Temp 36.2 °C (97.2 °F) 10/26/22 0953   Pulse 71 10/26/22 1032   Resp 19 10/26/22 1032   SpO2 94 % 10/26/22 1032   Vitals shown include unvalidated device data.

## 2022-10-27 ENCOUNTER — DOCUMENTATION ONLY (OUTPATIENT)
Dept: SURGERY | Age: 67
End: 2022-10-27

## 2022-10-27 NOTE — PROGRESS NOTES
Dr Marce Alvarez appointment is 11/14/2022 @ 1:00 pm    Dr Christine Packer appointment is 11/17/2022 at 1:00 pm , arrival time is 12:30 pm.

## 2022-10-28 ENCOUNTER — DOCUMENTATION ONLY (OUTPATIENT)
Dept: SURGERY | Age: 67
End: 2022-10-28

## 2022-10-31 ENCOUNTER — NURSE NAVIGATOR (OUTPATIENT)
Dept: CASE MANAGEMENT | Age: 67
End: 2022-10-31

## 2022-10-31 NOTE — NURSE NAVIGATOR
[address]  Breast Cancer Nurse Navigator Encounter    Name: Yoel Coulter  Age: 75 y.o.  : 1955  Diagnosis: Right breast IDC; ER+/VA+/HER2-    Encounter type:  [x]Initial Navigator Encounter  []Patient Initiated  []Navigator Follow-up  []Other:     Narrative:   Called pt to introduce self/role. No answer at this time. Left VM.      Interdisciplinary Team:  Surg-Onc: aRffi Judd MD  Med-Onc:  Rad-Onc:  Plastics:  :   Nurse Navigator: MIAN Lambert BSN, RN, [facility]  Breast Cancer Nurse [address] [place] [address]  W: [phone]  F: [phone]  [email or web address]   [facility] in [facility]

## 2022-11-01 ENCOUNTER — TELEPHONE (OUTPATIENT)
Dept: SURGERY | Age: 67
End: 2022-11-01

## 2022-11-01 NOTE — TELEPHONE ENCOUNTER
Spoke with patient yesterday. RIGHT lumpectomy UOQ, 17mm invasive ductal carcinoma, grade 2, 0/3 nodes, ER %. 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

## 2022-11-03 ENCOUNTER — DOCUMENTATION ONLY (OUTPATIENT)
Dept: SURGERY | Age: 67
End: 2022-11-03

## 2022-11-03 NOTE — PROGRESS NOTES
Verbally informed patient of surgery    [facility]    Date: 11/16/2022 @ 1:15 PM     Arrive:11:15 AM  report to 2nd floor outpatient registration day of surgery.     PAT: had pat recently     Arrive: not needed : report to 2nd floor volunteer desk, take a right off the elevator      Gave following instructions:    NPO after Midnight the night before    Shower in AM, no lotion, deodorant, powder,perfume or makeup    Will need  morning of the surgery

## 2022-11-09 ENCOUNTER — DOCUMENTATION ONLY (OUTPATIENT)
Dept: SURGERY | Age: 67
End: 2022-11-09

## 2022-11-09 NOTE — PROGRESS NOTES
I called and left a message about patients oncology appointments next week.   If she has questions I told her to call me @ [phone] ext [phone]

## 2022-11-11 ENCOUNTER — TELEPHONE (OUTPATIENT)
Dept: SURGERY | Age: 67
End: 2022-11-11

## 2022-11-11 ENCOUNTER — DOCUMENTATION ONLY (OUTPATIENT)
Dept: SURGERY | Age: 67
End: 2022-11-11

## 2022-11-14 ENCOUNTER — NURSE NAVIGATOR (OUTPATIENT)
Dept: CASE MANAGEMENT | Age: 67
End: 2022-11-14

## 2022-11-14 ENCOUNTER — OFFICE VISIT (OUTPATIENT)
Dept: ONCOLOGY | Age: 67
End: 2022-11-14
Payer: MEDICARE

## 2022-11-14 VITALS
OXYGEN SATURATION: 97 % | SYSTOLIC BLOOD PRESSURE: 157 MMHG | HEIGHT: 64 IN | TEMPERATURE: 98 F | WEIGHT: 146 LBS | BODY MASS INDEX: 24.92 KG/M2 | HEART RATE: 93 BPM | DIASTOLIC BLOOD PRESSURE: 75 MMHG | RESPIRATION RATE: 18 BRPM

## 2022-11-14 DIAGNOSIS — Z17.0 MALIGNANT NEOPLASM OF UPPER-OUTER QUADRANT OF RIGHT BREAST IN FEMALE, ESTROGEN RECEPTOR POSITIVE (HCC): Primary | ICD-10-CM

## 2022-11-14 DIAGNOSIS — C50.411 MALIGNANT NEOPLASM OF UPPER-OUTER QUADRANT OF RIGHT BREAST IN FEMALE, ESTROGEN RECEPTOR POSITIVE (HCC): Primary | ICD-10-CM

## 2022-11-14 PROCEDURE — 99204 OFFICE O/P NEW MOD 45 MIN: CPT | Performed by: INTERNAL MEDICINE

## 2022-11-14 PROCEDURE — G8419 CALC BMI OUT NRM PARAM NOF/U: HCPCS | Performed by: INTERNAL MEDICINE

## 2022-11-14 PROCEDURE — 1090F PRES/ABSN URINE INCON ASSESS: CPT | Performed by: INTERNAL MEDICINE

## 2022-11-14 PROCEDURE — 3017F COLORECTAL CA SCREEN DOC REV: CPT | Performed by: INTERNAL MEDICINE

## 2022-11-14 PROCEDURE — G8432 DEP SCR NOT DOC, RNG: HCPCS | Performed by: INTERNAL MEDICINE

## 2022-11-14 PROCEDURE — 1101F PT FALLS ASSESS-DOCD LE1/YR: CPT | Performed by: INTERNAL MEDICINE

## 2022-11-14 PROCEDURE — G8536 NO DOC ELDER MAL SCRN: HCPCS | Performed by: INTERNAL MEDICINE

## 2022-11-14 PROCEDURE — G0463 HOSPITAL OUTPT CLINIC VISIT: HCPCS | Performed by: INTERNAL MEDICINE

## 2022-11-14 PROCEDURE — G9899 SCRN MAM PERF RSLTS DOC: HCPCS | Performed by: INTERNAL MEDICINE

## 2022-11-14 PROCEDURE — 1123F ACP DISCUSS/DSCN MKR DOCD: CPT | Performed by: INTERNAL MEDICINE

## 2022-11-14 PROCEDURE — G8427 DOCREV CUR MEDS BY ELIG CLIN: HCPCS | Performed by: INTERNAL MEDICINE

## 2022-11-14 PROCEDURE — G8400 PT W/DXA NO RESULTS DOC: HCPCS | Performed by: INTERNAL MEDICINE

## 2022-11-14 NOTE — PROGRESS NOTES
[facility] [place] [facility] [place] [address] [address] [facility], [address] [phone]  F: [phone]      Reason for Visit:   Ilya Mccauley is a 75 y.o. female who is seen in consultation at the request of Dr. Burdette Spurling for evaluation of therapy for breast cancer. Rad onc:  Dr. 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 Lisa De Paz note, took tamoxifen,on/off for 2 years  10/11/22 right breast core bx, 9:00, 4cmfn:  IDC, 1 cm, gr 2, ER + at %, WI + at 51-60%, HER 2 negative (IHC 2+; FISH ratio 1.4, sig/cell 3.4), ki 67 < 5%  10/26/22 right breast lumpectomy:  IDC, 1.7 cm, gr 2, DCIS gr 2, 0/3 LN, pT1c pN0 cM0    History of Present Illness: An abnormal mammogram led to the path above    FH:  maternal aunt with breast cancer at age 54; no ovarian, prostate, pancreas cancer    Past Medical History:   Diagnosis Date    Asthma     exercise induced    Breast cancer ([address]     Left breast  Right breast     Hypertension     Mitral valve prolapse     Pneumonia eosinophilous 2004    Skin cancer       Past Surgical History:   Procedure Laterality Date    HX BREAST LUMPECTOMY  2010    LEFT    HX BREAST LUMPECTOMY Right 10/26/2022    RIGHT BREAST LUMPECTOMY AND RIGHT BREAST SENTINEL NODE BIOPSY WITH BLUE DYE performed by Quintin Finch MD at [facility]    HX CYST REMOVAL      Ganglion    HX HEENT Bilateral     Pterygium    HX LIPOMA RESECTION  2000    left leg    HX MOHS PROCEDURES Left     face-Cheek      Social History     Tobacco Use    Smoking status: Never    Smokeless tobacco: Never   Substance Use Topics    Alcohol use:  Yes     Alcohol/week: 5.0 standard drinks     Types: 5 Glasses of wine per week      Family History   Problem Relation Age of Onset    Diabetes Mother     Hypertension Father     Stroke Father     Cancer Paternal Aunt 61        Breast cancer     Current Outpatient Medications   Medication Sig    calcium carbonate (CALCIUM 300 PO) Take  by mouth.    lisinopriL (PRINIVIL, ZESTRIL) 20 mg tablet Take 20 mg by mouth every morning. AMLODIPINE BENZOATE PO Take 5 mg by mouth every morning. No current facility-administered medications for this visit. No Known Allergies     Review of Systems: A complete review of systems was obtained, negative except as described above. Physical Exam:   Visit Vitals  BP (!) 157/75   Pulse 93   Temp 98 °F (36.7 °C) (Temporal)   Resp 18   Ht 5' 4\" (1.626 m)   Wt 146 lb (66.2 kg)   SpO2 97%   BMI 25.06 kg/m²     ECOG PS: 0    Constitutional: Appears well-developed and well-nourished in no apparent distress      Mental status: Alert and awake, Oriented to person/place/time, Able to follow commands    Eyes: EOM normal, Sclera normal, No visible ocular discharge    HENT: Normocephalic, atraumatic    Mouth/Throat: Moist mucous membranes    External Ears normal    Neck: No visualized mass    Pulmonary/Chest: Respiratory effort normal, No visualized signs of difficulty breathing or respiratory distress    Musculoskeletal: Normal gait with no signs of ataxia, Normal range of motion of neck    Neurological: No facial asymmetry (Cranial nerve 7 motor function), No gaze palsy    Skin: No significant exanthematous lesions or discoloration noted on facial skin    Psychiatric: Normal affect, No hallucinations           Results:     Lab Results   Component Value Date/Time    WBC 7.0 01/27/2014 07:23 AM    HGB 13.9 01/27/2014 07:23 AM    HCT 42.0 01/27/2014 07:23 AM    PLATELET 353 16/15/7245 07:23 AM    MCV 89 01/27/2014 07:23 AM    ABS.  NEUTROPHILS 3.5 01/27/2014 07:23 AM     Lab Results   Component Value Date/Time    Sodium 142 01/27/2014 07:23 AM    Potassium 3.9 01/27/2014 07:23 AM    Chloride 101 01/27/2014 07:23 AM    CO2 25 01/27/2014 07:23 AM    Glucose 138 (H) 01/27/2014 07:23 AM    BUN 16 01/27/2014 07:23 AM    Creatinine 0.83 01/27/2014 07:23 AM    GFR est AA 89 01/27/2014 07:23 AM    GFR est non-AA 77 01/27/2014 07:23 AM    Calcium 9.6 01/27/2014 07:23 AM     Lab Results   Component Value Date/Time    Bilirubin, total 1.0 01/27/2014 07:23 AM    ALT (SGPT) 12 01/27/2014 07:23 AM    Alk. phosphatase 90 01/27/2014 07:23 AM    Protein, total 6.7 01/27/2014 07:23 AM    Albumin 4.5 01/27/2014 07:23 AM         Records reviewed and summarized above. Pathology report(s) reviewed above. Radiology report(s) reviewed above. Assessment/plan:   1. 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 We explained why a patient can have microscopic cancer spread now even though physical examination, laboratory studies and imaging studies are negative for cancer. We explained that the same treatments used now as adjuvant or preventive treatments rarely if ever are curative in women who develop metastases. Having re-ex for DCIS margin on 11/16/22      Using eprognosis. org, her 5 year all cause mortality risk is 6-8% her 10 year all cause mortality risk is 15-23%; her median OS is 17-21 years. An adjuvant discussion is warranted. We discussed the potential value of OncotypeDx testing. The patient was told that this test uses genetic information from the patients own breast cancer tissue to estimate the ten year risk of distant metastases if she takes tamoxifen. The patient was also told that this test is most appropriate in patients with node-negative breast cancer that is estrogen-receptor positive. Emerging data suggest it can also be helpful in women with 1-3 postive nodes.  The patient was told that this test can help in decisions concerning the potential benefits of chemotherapy given in addition to hormone therapy like tamoxifen. This is now universally covered for the above indication, the prior phrase was my mistake, outdated    In addition we discussed the StudyEdge trial which uses the OncotypeDx test to determine risk of recurrence. The patient was told that for low risk patients chemotherapy was not recommended, that for patients at intermediate risk there was a randomization to chemotherapy or not, and that for high-risk patients chemotherapy was recommended. The trial showed that for most patients, there was not a benefit of chemotherapy in the intermediate group (< 26 RS if > 48years old). The oncotype has been ordered, result pending    Would discuss chemo if  RS > 26    She is an excellent candidate for XRT and endocrine therapy, sees Dr. Chase this week    The risks and benefits of aromatase inhibitors (anastrozole, letrozole, and exemestane) were discussed in detail and the patient was informed of the following: Risks include the development of painful muscles and joints (arthralgia/myalgia) and bone loss. Muscle and joint pain can be severe but rarely result in any tissue damage; symptoms usually resolve in several weeks when the medication is stopped. Bone loss is common and a bone density test is recommended as a baseline and then yearly to every several years depending on initial results. The risk of fractures is increased by a few percent in patients taking these drugs, but careful monitoring of bone density and using bone protecting agents when indicated can minimize these risks. Unlike tamoxifen there is no increased risk of blood clots or endometrial cancer. AIs can cause or worsen vaginal dryness but women using these drugs should not use vaginal estrogen preparations for these symptoms. AIs can also cause or increase hot flashes.  Any other symptoms should be reported. The risks and benefits of tamoxifen were discussed in detail and the patient was informed of the following: Risks include a 1% risk of endometrial cancer for postmenopausal women treated for five years but no (or a minimally increased) risk in premenopausal women and that most women who develop tamoxifen-associated endometrial cancer can be cured. Any bleeding in a postmenopausal woman should be reported to a health care professional. There is also a 1% risk of blood clots (thromboembolism) that can be fatal. All patients irrespective of age who take tamoxifen and who have not had a hysterectomy should have a pelvic exam and Pap smear yearly. Tamoxifen increases the risk of cataract formation and on rare occasions has caused retinal damage: an eye exam is recommended yearly. Other risks include vaginal discharge or dryness, the development or worsening of hot flashes or vasomotor symptoms, and bone loss in premenopausal women. There is excellent evidence that tamoxifen does not increase risk of depression, cause weight gain or have a major effect on sexual function. Available data suggests little or no effect on cognitive function. Benefits include a lowering of cholesterol and a reduction in the rate of bone loss for postmenopausal woman. Any other symptoms should be reported. After this discussion, she is agreeable to trying anastrozole, would have her start 1 week after the completion of XRT. Will rx in after oncotype returns    Follow-up after early breast cancer was discussed. I recommend follow-up as defined by the American Society of Clinical Oncology and [facility]. This includes a visit to a health care professional every 3-6 months for 3 years, then every 6-12 months for 2 years, and then yearly as well as mammograms yearly. 2. Emotional well being:  She has excellent support and is coping well with her disease    3.  Genetic testing:   discussed potential ramifications of a positive test including the potential need for bilateral mastectomies and bilateral oophorectomies and the risk then for her family members to also have a mutation. VUS also discussed. She is agreeable, will send a kit to her house    4. Osteopenia:  done last year, she will provide copy; taking calcium OTC    50 min were spent in this encounter with time spent in face to face discussion, record review, and documentation      I appreciate the opportunity to participate in [address] [facility]. Signed By: Shai Ricardo MD      No orders of the defined types were placed in this encounter.

## 2022-11-14 NOTE — NURSE NAVIGATOR
[address]  Breast Cancer Nurse Navigator Encounter    Name: Michelle Denis  Age: 75 y.o.  : 1955  Diagnosis: Right breast IDC; ER+/VT+/HER2-    Encounter type:  []Initial Navigator Encounter  []Patient Initiated  [x]Navigator Follow-up  []Other:     Narrative:   Met with pt while in clinic for her med/onc consult. Introduced self/role of NN. Pt reports healing well from initial lumpectomy. Scheduled for a re-excision on Wednesday. This week she is also scheduled for a cataract procedure, physical therapy, and radiation oncology consult. Pt states she was an avid runner until a pelvic fracture this past spring. She is working with PT to help alleviate pain and increase mobility. So far she is having good results. We discussed ordered Oncotype. Pt understands this may take a couple of weeks to come back. She shares she has taken Tamoxifen in the past with adverse effects that impacted quality of life. She understands there are other medication options and being post-menopausal she would likely be prescribed an aromatase inhibitor. Assured her Dr. Jory Ardon would discuss with her in detail. Contact info given and pt encouraged to reach out as needed.      Interdisciplinary Team:  Surg-Onc: Mario Benítez MD  Med-Onc: Little oYu MD  Rad-Onc:  Plastics:  :   Nurse Navigator: MIAN Gonzalez BSN, RN, V[facility]  Breast Cancer Nurse Navigator    [address]  [address]  W: [phone]  F: [phone]  [email or web address]   Good Help to Those in [facility]

## 2022-11-15 ENCOUNTER — ANESTHESIA EVENT (OUTPATIENT)
Dept: SURGERY | Age: 67
End: 2022-11-15
Payer: MEDICARE

## 2022-11-15 ENCOUNTER — TELEPHONE (OUTPATIENT)
Dept: ONCOLOGY | Age: 67
End: 2022-11-15

## 2022-11-15 NOTE — TELEPHONE ENCOUNTER
[address]  Oncology Social Work  Encounter     Patient Name:  Ann-Marie Hoover    Medical History: breast cancer    Advance Directives: none on file    Narrative: Call placed to patient to introduce oncology social work role. Left voicemail message requesting a return call.      Thank you,   Annette Gaspar LCSW

## 2022-11-16 ENCOUNTER — HOSPITAL ENCOUNTER (OUTPATIENT)
Age: 67
Setting detail: OUTPATIENT SURGERY
Discharge: HOME | End: 2022-11-16
Attending: SURGERY | Admitting: SURGERY
Payer: MEDICARE

## 2022-11-16 ENCOUNTER — DOCUMENTATION ONLY (OUTPATIENT)
Dept: SURGERY | Age: 67
End: 2022-11-16

## 2022-11-16 ENCOUNTER — ANESTHESIA (OUTPATIENT)
Dept: SURGERY | Age: 67
End: 2022-11-16
Payer: MEDICARE

## 2022-11-16 VITALS
SYSTOLIC BLOOD PRESSURE: 128 MMHG | BODY MASS INDEX: 24.54 KG/M2 | OXYGEN SATURATION: 97 % | DIASTOLIC BLOOD PRESSURE: 66 MMHG | TEMPERATURE: 98.6 F | WEIGHT: 143.74 LBS | HEART RATE: 73 BPM | HEIGHT: 64 IN | RESPIRATION RATE: 21 BRPM

## 2022-11-16 DIAGNOSIS — Z17.0 MALIGNANT NEOPLASM OF LOWER-OUTER QUADRANT OF RIGHT BREAST OF FEMALE, ESTROGEN RECEPTOR POSITIVE (HCC): Primary | ICD-10-CM

## 2022-11-16 DIAGNOSIS — C50.511 MALIGNANT NEOPLASM OF LOWER-OUTER QUADRANT OF RIGHT BREAST OF FEMALE, ESTROGEN RECEPTOR POSITIVE (HCC): Primary | ICD-10-CM

## 2022-11-16 PROCEDURE — 74011250636 HC RX REV CODE- 250/636: Performed by: ANESTHESIOLOGY

## 2022-11-16 PROCEDURE — 2709999900 HC NON-CHARGEABLE SUPPLY: Performed by: SURGERY

## 2022-11-16 PROCEDURE — 77030040361 HC SLV COMPR DVT MDII -B

## 2022-11-16 PROCEDURE — 77030040922 HC BLNKT HYPOTHRM STRY -A

## 2022-11-16 PROCEDURE — 76210000050 HC AMBSU PH II REC 0.5 TO 1 HR: Performed by: SURGERY

## 2022-11-16 PROCEDURE — 76060000061 HC AMB SURG ANES 0.5 TO 1 HR: Performed by: SURGERY

## 2022-11-16 PROCEDURE — 74011000250 HC RX REV CODE- 250: Performed by: SURGERY

## 2022-11-16 PROCEDURE — 19301 PARTIAL MASTECTOMY: CPT | Performed by: SURGERY

## 2022-11-16 PROCEDURE — 74011000250 HC RX REV CODE- 250: Performed by: ANESTHESIOLOGY

## 2022-11-16 PROCEDURE — 77030002996 HC SUT SLK J&J -A: Performed by: SURGERY

## 2022-11-16 PROCEDURE — 88307 TISSUE EXAM BY PATHOLOGIST: CPT

## 2022-11-16 PROCEDURE — 76030000000 HC AMB SURG OR TIME 0.5 TO 1: Performed by: SURGERY

## 2022-11-16 RX ORDER — PROPOFOL 10 MG/ML
INJECTION, EMULSION INTRAVENOUS
Status: DISCONTINUED | OUTPATIENT
Start: 2022-11-16 | End: 2022-11-16 | Stop reason: HOSPADM

## 2022-11-16 RX ORDER — ONDANSETRON 2 MG/ML
INJECTION INTRAMUSCULAR; INTRAVENOUS AS NEEDED
Status: DISCONTINUED | OUTPATIENT
Start: 2022-11-16 | End: 2022-11-16 | Stop reason: HOSPADM

## 2022-11-16 RX ORDER — SODIUM CHLORIDE, SODIUM LACTATE, POTASSIUM CHLORIDE, CALCIUM CHLORIDE 600; 310; 30; 20 MG/100ML; MG/100ML; MG/100ML; MG/100ML
125 INJECTION, SOLUTION INTRAVENOUS CONTINUOUS
Status: DISCONTINUED | OUTPATIENT
Start: 2022-11-16 | End: 2022-11-16 | Stop reason: HOSPADM

## 2022-11-16 RX ORDER — SODIUM CHLORIDE, SODIUM LACTATE, POTASSIUM CHLORIDE, CALCIUM CHLORIDE 600; 310; 30; 20 MG/100ML; MG/100ML; MG/100ML; MG/100ML
75 INJECTION, SOLUTION INTRAVENOUS CONTINUOUS
Status: DISCONTINUED | OUTPATIENT
Start: 2022-11-16 | End: 2022-11-16 | Stop reason: HOSPADM

## 2022-11-16 RX ORDER — FENTANYL CITRATE 50 UG/ML
INJECTION, SOLUTION INTRAMUSCULAR; INTRAVENOUS AS NEEDED
Status: DISCONTINUED | OUTPATIENT
Start: 2022-11-16 | End: 2022-11-16 | Stop reason: HOSPADM

## 2022-11-16 RX ORDER — DIPHENHYDRAMINE HYDROCHLORIDE 50 MG/ML
12.5 INJECTION, SOLUTION INTRAMUSCULAR; INTRAVENOUS AS NEEDED
Status: DISCONTINUED | OUTPATIENT
Start: 2022-11-16 | End: 2022-11-16 | Stop reason: HOSPADM

## 2022-11-16 RX ORDER — ONDANSETRON 2 MG/ML
4 INJECTION INTRAMUSCULAR; INTRAVENOUS AS NEEDED
Status: DISCONTINUED | OUTPATIENT
Start: 2022-11-16 | End: 2022-11-16 | Stop reason: HOSPADM

## 2022-11-16 RX ORDER — LIDOCAINE HYDROCHLORIDE 10 MG/ML
0.1 INJECTION, SOLUTION EPIDURAL; INFILTRATION; INTRACAUDAL; PERINEURAL AS NEEDED
Status: DISCONTINUED | OUTPATIENT
Start: 2022-11-16 | End: 2022-11-16 | Stop reason: HOSPADM

## 2022-11-16 RX ORDER — LIDOCAINE HYDROCHLORIDE 20 MG/ML
INJECTION, SOLUTION EPIDURAL; INFILTRATION; INTRACAUDAL; PERINEURAL AS NEEDED
Status: DISCONTINUED | OUTPATIENT
Start: 2022-11-16 | End: 2022-11-16 | Stop reason: HOSPADM

## 2022-11-16 RX ORDER — HYDROMORPHONE HYDROCHLORIDE 1 MG/ML
.25-1 INJECTION, SOLUTION INTRAMUSCULAR; INTRAVENOUS; SUBCUTANEOUS
Status: DISCONTINUED | OUTPATIENT
Start: 2022-11-16 | End: 2022-11-16 | Stop reason: HOSPADM

## 2022-11-16 RX ORDER — MIDAZOLAM HYDROCHLORIDE 1 MG/ML
INJECTION, SOLUTION INTRAMUSCULAR; INTRAVENOUS AS NEEDED
Status: DISCONTINUED | OUTPATIENT
Start: 2022-11-16 | End: 2022-11-16 | Stop reason: HOSPADM

## 2022-11-16 RX ORDER — BUPIVACAINE HYDROCHLORIDE AND EPINEPHRINE 5; 5 MG/ML; UG/ML
30 INJECTION, SOLUTION EPIDURAL; INTRACAUDAL; PERINEURAL
Status: COMPLETED | OUTPATIENT
Start: 2022-11-16 | End: 2022-11-16

## 2022-11-16 RX ADMIN — PROPOFOL 50 MCG/KG/MIN: 10 INJECTION, EMULSION INTRAVENOUS at 14:12

## 2022-11-16 RX ADMIN — LIDOCAINE HYDROCHLORIDE 40 MG: 20 INJECTION, SOLUTION INTRAVENOUS at 14:18

## 2022-11-16 RX ADMIN — SODIUM CHLORIDE, POTASSIUM CHLORIDE, SODIUM LACTATE AND CALCIUM CHLORIDE 75 ML/HR: 600; 310; 30; 20 INJECTION, SOLUTION INTRAVENOUS at 12:00

## 2022-11-16 RX ADMIN — MIDAZOLAM HYDROCHLORIDE 0.5 MG: 1 INJECTION, SOLUTION INTRAMUSCULAR; INTRAVENOUS at 14:51

## 2022-11-16 RX ADMIN — PROPOFOL 20 MG: 10 INJECTION, EMULSION INTRAVENOUS at 14:18

## 2022-11-16 RX ADMIN — FENTANYL CITRATE 50 MCG: 50 INJECTION, SOLUTION INTRAMUSCULAR; INTRAVENOUS at 14:12

## 2022-11-16 RX ADMIN — MIDAZOLAM HYDROCHLORIDE 0.5 MG: 1 INJECTION, SOLUTION INTRAMUSCULAR; INTRAVENOUS at 14:34

## 2022-11-16 RX ADMIN — ONDANSETRON HYDROCHLORIDE 4 MG: 2 SOLUTION INTRAMUSCULAR; INTRAVENOUS at 14:12

## 2022-11-16 RX ADMIN — FENTANYL CITRATE 25 MCG: 50 INJECTION, SOLUTION INTRAMUSCULAR; INTRAVENOUS at 14:36

## 2022-11-16 RX ADMIN — FENTANYL CITRATE 25 MCG: 50 INJECTION, SOLUTION INTRAMUSCULAR; INTRAVENOUS at 14:48

## 2022-11-16 RX ADMIN — MIDAZOLAM HYDROCHLORIDE 1 MG: 1 INJECTION, SOLUTION INTRAMUSCULAR; INTRAVENOUS at 14:23

## 2022-11-16 RX ADMIN — MIDAZOLAM HYDROCHLORIDE 1 MG: 1 INJECTION, SOLUTION INTRAMUSCULAR; INTRAVENOUS at 14:17

## 2022-11-16 RX ADMIN — PROPOFOL 10 MG: 10 INJECTION, EMULSION INTRAVENOUS at 14:25

## 2022-11-16 RX ADMIN — MIDAZOLAM HYDROCHLORIDE 2 MG: 1 INJECTION, SOLUTION INTRAMUSCULAR; INTRAVENOUS at 14:12

## 2022-11-16 NOTE — PERIOP NOTES
PACU IN REPORT FROM ANESTHESIA    Verbal report received from   Audrey Santana   [x] MD/DO-Anesthesiologist    [] CRNA   [] with student    CHOICE ANESTHESIA:  [] GENERAL  [] TIVA  [x] MAC  [x] LOCAL  [] REGIONAL  [] SPINAL   [] EPIDURAL   **Note the anesthesia record for medications given intraoperatively. **           [] E.R.A.S. PROTOCOL    SURGICAL PROCEDURE: Procedure(s) (LRB):  RE EXCISION RIGHT BREAST LUMPECTOMY (Right)     SURGEON: Hortensia Bermeo MD.    Brief Initial Visual Assessment:    Patient Age: [] Infant(1-12mo)      []Pediatric(1-13yrs)    [] Adolescent(13-18yrs)    [] Adult(18-65yrs)      [x]Geriatric Adult(>65yrs). Patient    [x] Alert           [x]Calm & Cooperative      [] Anxious  Appearance: [] Drowsy      [] Sedated      [] Unresponsive     Oriented x  3            Airway:     [x] Patent          [] \"Difficult Airway\" report by Anesthesia                        [] Obstructs easily/Obstructed on arrival          [] Manual stimulation and/or airway assistance necessary                         [] Airway improved with head/airway repositioning                       Airway Adjuncts Present: [] Oral Airway    [] Nasal Trumpet    [] ETT    [] LMA            Respiratory  [x] Even   [] Labored   [] Shallow   [] Tachypnea   [] Bradypnea  Pattern:    [x] Non-Labored  [] VENT and/or respiratory assistance     being provided. Skin:     [x] Pink [] Dusky    [] Pale        [x] Warm    [] Hot [] Cool       [] Cold   [x]Dry [] Moist [] Diaphoretic     Membranes:  [x] Pink [] Pale       [x] Moist [] Dry     [] Crusty     Pain:   [x] No Acute Discomfort. 0  /10 Scale [x] Verbal Numeric   [] Moderate Discomfort.     [] V.A.S. [] Acute Discomfort.                [] A.N.V.    [] Chronic-Issue Related Discomfort.   [] F.L.A.C.C. Note E-MAR for medications administered.   []Faces, Robbins/Baker    Note assessments documented in flowsheets; any assessment variants to be found in comments or narrative perioperative nurse notes.        Post-anesthesia care now assumed by [facility] BSN, RN-BC

## 2022-11-16 NOTE — ANESTHESIA PREPROCEDURE EVALUATION
Relevant Problems   RESPIRATORY SYSTEM   (+) Asthma      CARDIOVASCULAR   (+) MVP (mitral valve prolapse)      PERSONAL HX & FAMILY HX OF CANCER   (+) Breast CA (HCC)       Anesthetic History   No history of anesthetic complications            Review of Systems / Medical History  Patient summary reviewed and nursing notes reviewed    Pulmonary                   Neuro/Psych   Within defined limits           Cardiovascular    Hypertension: well controlled              Exercise tolerance: >4 METS     GI/Hepatic/Renal  Within defined limits              Endo/Other        Arthritis and cancer (right breast cancer, basal cell, left breast)    Comments: Right breast cancer Other Findings              Physical Exam    Airway  Mallampati: II    Neck ROM: normal range of motion   Mouth opening: Normal     Cardiovascular    Rhythm: regular  Rate: normal         Dental    Dentition: Upper dentition intact and Lower dentition intact     Pulmonary  Breath sounds clear to auscultation               Abdominal         Other Findings            Anesthetic Plan    ASA: 2  Anesthesia type: MAC            Anesthetic plan and risks discussed with: Patient and Spouse      Informed consent obtained.

## 2022-11-16 NOTE — DISCHARGE INSTRUCTIONS
Discharge Instructions from Dr. [facility]    Patient Discharge Instructions    Corinne [place] / [id number] : 1955    Admitted 2022 Discharged: 2022   What to do at Home  Diet:Regular  Activity: As tolerated. No driving if taking pain medication. Okay to shower or take a bath. You may chose to wear a bra to sleep in for extra support for the next few days. Pain control: Ice pack 20 minutes of every hour until you go to bed tonight. You may use over-the-counter medication as needed (acetominophen, aspirin, ibuprofen). Tomorrow you may put a heat pack on the breast.  Dressing: Steristrips. It is okay to wash normally at this site. Steristrips may be removed if still present 7-10 days after surgery. Follow up: If needed, call the office to make an appointment. [phone]. I will call with results within one week. [address] , [address]  Problems/Questions: Call Shan Tang MD on my cell phone. [phone]              DISCHARGE SUMMARY from Your Nurse    PATIENT INSTRUCTIONS:    AFTER ANESTHESIA & SEDATION, and WHILE TAKING PAIN MEDICINE  After general anesthesia / intravenous sedation and the 24 hours following, and/or while taking prescription Opiates:  Limit your activities  Do not drive and operate hazardous machinery until you have been of all narcotics and sedatives for over 24 hours  Do not make important personal or business decisions  Do  not drink alcoholic beverages  If you have not urinated within 8 hours after discharge, please contact your surgeon on call.         SIGNS OF INFECTION, THINGS TO REPORT TO YOUR DOCTOR  Report the following Signs of Infection or General Problems after surgery to your surgeon:  Excessive pain, swelling, redness, drainage, pus or odor of or around the surgical area  Fever/ temperature over 101; Temperature over 100 if on medications (chemotherapy or medicines which affect your ability to fight infections)  Nausea and vomiting lasting longer than 4 hours or if unable to take medications  Any signs of decreased circulation or nerve impairment to extremity: change in color, persistent  numbness, tingling, coldness or increase pain  If you have any questions. GOOD HELP TO FIGHT AN INFECTION  Here are a few tip to help reduce the chance of getting an infection after surgery:  Wash Your Hands  Good handwashing is the most important thing you and your caregiver can do. Wash before and after caring for any wounds. Dry your hand with a clean towel. Wash with soap and water for at least 20 seconds. A TIP: sing the \"Happy Birthday\" song through one time while washing to help with the timing. Use a hand  in between washings. Shower  When your surgeon says it is OK to take a shower, use a new bar of antibacterial soap (if that is what you use, and keep that bar of soap ONLY for your use), or antibacterial body wash. Use a clean wash cloth or sponge when you bathe. Dry off with a clean towel  after every bath - be careful around any wounds, skin staples, sutures or surgical glue over/on wounds. Do not enter swimming pools, hot tubs, lakes, rivers and/or ocean until wounds are healed and your doctor/surgeon says it is OK. Use Clean Sheets  Sleep on freshly laundered sheets after your surgery. Keep the surgery site covered with a clean, dry bandage (if instructed to do so). If the bandage becomes soiled, reapply a new, dry, clean bandage. Do not allow pets to sleep with you while your wound is healing. Lifestyle Modification and Controlling Your Blood Sugar  Smoking slows wound healing. Stop smoking and limit exposure to second-hand smoke. High blood sugar slows wound healing.   Eat a well-balanced diet to provide proper nutrition while healing  Monitor your blood sugar (if you are a diabetic) and take your medications as you are suppose to so you can control you blood sugar after surgery. URINARY RETENTION AFTER SURGERY  Some surgery (a planned, long surgery, bladder surgery, or some surgeries of the abdomen) require the placement of a galo catheter (a drain tube in the bladder.)  This drain is often removed prior to you coming out of the OR, or is occasionally left in place to be removed before discharge, or sometimes the drain tube is left to be removed in the surgeon's office at a later time. Other surgeries never require a drain in the bladder. But sometimes after surgery, even if a drain was never placed, going to the bathroom can become a problem (you feel like you have to pee, your bladder feels full, but you just cant go.)  In this case, you might need to return to the hospital to have a drain catheter placed. Call your surgeon and tell them if this problem happens to you. COUGH AND DEEP BREATHE  Breathing deep and coughing are very important exercises to do after surgery. Deep breathing and coughing open the little air tubes and air sacks in your lungs. You take deep breaths every day. You may not even notice - it is just something you do when you sigh or yawn. It is a natural exercise you do to keep these air passages open. After surgery, take deep breaths and cough, on purpose. Coughing and deep breathing help prevent bronchitis and pneumonia after surgery. If you had chest or belly surgery, use a pillow as a \"hug dulce maria\" and hold it tightly to your chest or belly when you cough. DIRECTIONS:  Take 10 to 15 slow deep breaths every hour while awake. Breathe in deeply, and hold it for 2 seconds. Exhale slowly through puckered lips, like blowing up a balloon. After every 4th or 5th deep breath, hug your pillow to your chest or belly and give a hard, deep cough. Yes, it will probably hurt if you had abdominal surgery. But doing this exercise is very important part of healing after surgery.   Take your pain medicine to help you do this exercise without too much pain. ANKLE PUMPS    Ankle pumps increase the circulation of oxygenated blood to your lower extremities and decrease your risk for circulation problems such as blood clots. They also stretch the muscles, tendons and ligaments in your foot and ankle, and prevent joint contracture in the ankle and foot, especially after surgeries on the legs. It is important to do ankle pump exercises regularly after surgery because immobility increases your risk for developing a blood clot. Your doctor may also have you take an Aspirin for the next few days as well. If your doctor did not ask you to take an Aspirin, consult with him before starting Aspirin therapy on your own. The exercise is quite simple. Slowly point your foot forward, feeling the muscles on the top of your lower leg stretch, and hold this position for 5 seconds. Next, pull your foot back toward you as far as possible, stretching the calf muscles, and hold that position for 5 seconds. Repeat with the other foot. Perform 10 repetitions every hour while awake for both ankles if possible (down and then up with the foot once is one repetition). You should feel gentle stretching of the muscles in your lower leg when doing this exercise. If you feel pain, or your range of motion is limited, don't push too hard. Only go the limit your joint and muscles will let you go. If you have increasing pain, progressively worsening leg warmth or swelling, STOP the exercise and call your doctor. Other Wound Care information:  [x] No additional recommendations. Below is information on the medication(s) your doctor is prescribing for you: The maximum daily dose of acetaminophen was discussed with the patient.  She was encouraged not to exceed 3,000 mg of acetaminophen during a 24 hour period and was asked to keep in mind that acetaminophen can also be found in many over-the-counter cold medications as well as narcotics that may be given for pain. The patient expresses understanding of these issues and questions were answered. 4 THINGS ABOUT PAIN MEDICINE I ALWAYS TALK ABOUT:  There are 4 side effects I always talk about for pain medications. They make you sleepy and drowsy. Do not drive a car or operate machines while taking pain medication. Do not make any major decisions. Take a nap. Relax. Let your body recover from the affects of anesthesia and surgery. Some people have quite a problem with itching and... Nausea and/or vomiting. These are mention together because they are a related genetic issue; while some people experience these problems, others do not. These are expected and know side effects. Itching is caused by histamine release - practically all opiate medications can cause this. An over-the-counter anti-histamine can help. Over-the-counter Benadryl® (the generic drug name is diphenhydramine) can help, but may cause increased drowsiness which can be intensified by pain medications. Over-the-counter Claritin® (the generic drug name is loratadine) or Zyrtec® (the generic drug name is cetirizine) may be effective without as much drowsiness as with the Benadryl/diphenhydramine. If you have nausea, like the itching, practically all opioids can cause this. Hopefully your surgeon may have given you some medicine for nausea. If your surgeon did not give you anti-nausea medications, and you are experiencing nausea/vomiting that prevent you from drinking clear liquids, CALL HIM/HER and request them, especially if these issues seem to get worse after you leave the hospital.    Last but not least is the problem of constipation (not kartik able to have a bowel movement - poop.)  All pain medicine can slow down the movement of food through the gut. The slower it goes, the worse it can be. This only adds insult to the injury of surgery. And if you had tummy surgery, like having your gall bladder removed or a hernia repair, YOU DO NOT WANT THIS PROBLEM. There are 4 things I recommend. Drink lots of fluids. For healthy people with no heart problems, this means at least 64 ounces of liquids or more per day. For example, a Big Gulp® from [employer] is 32 ounces. So you need to drink at least 2  Big Gulp®'s of fluids every day. If you have heart problems you may not be able to do this. Talk to your doctor about what you should do to prevent constipation. Drinking fruit juice like apple, pear, or prune juice gives you extra \"BANG\" for your beverage. These drinks are high in natural fiber. If you are a diabetic, drink sugar-free fluids with fiber additives (see next 2 points.)  Avoid drinking extra fruit juice unless this is a regular part of your diet plan. Eat extra fresh fruits and vegetables. Add extra fiber-products. Fiber products like Metamucil®, Citrucel®, Miralax® or Benefiber® can help. These products are over-the-counter and you do not need a prescription from your doctor. If you have followed these recommendations and still have some difficulty having a good poop, take and over-the-counter stimulant like Dulcolax® (biscodyl)  or Senokot® (senna concentrate). These may help get things moving. [facility] MEDICATION AND   SIDE EFFECT GUIDE    The [facility] Insurance MEDICATION AND SIDE EFFECT GUIDE was provided to the PATIENT AND CARE PROVIDER. Information provided includes instruction about drug purpose and common side effects for the following medications:   Over-the-Counter TYLENOL (Acetaminophen) and/or MOTRIN (Ibuprofen) (follow package instructions) - call your surgeon/doctor if your pain is uncontrolled and you require something else.        Medication information added to discharge record on November 16, 2022 at 2:44 PM.      Some information we wish all of our patients to be familiar with and General Information for Healthy Lifestyle choices:    Make a list of your current medications with your Primary Care Provider. Update this list whenever your medications are discontinued, doses are changed, or new medications (including over-the-counter products like ibuprofen, vitamins, or herbal remedies) are added. Carry medication information at all times in case of emergency situations      No smoking / No tobacco products / Avoid exposure to second hand smoke    Surgeon General's Warning:  Quitting smoking now greatly reduces serious risk to your health. Obesity, smoking, and sedentary lifestyle greatly increases your risk for illness. A healthy diet, regular physical exercise & weight monitoring are important for maintaining a healthy lifestyle. A Note About Congestive Heart Failure: You may be retaining fluid if you have a history of heart failure or if you experience any of the following symptoms:      Weight gain of 3 pounds or more overnight or 5 pounds in a week  Increased swelling in our hands or feet  Shortness of breath while lying flat in bed      Please call your doctor as soon as you notice any of these symptoms; do not wait until your next office visit. A Note About Strokes:  Recognize signs and symptoms of STROKE. The simple mnemonic, F.A.S.T., can help you remember signs of a stroke and what to do if you suspect a stroke is occuring to you or someone you are with:    F - Face looks uneven  A - Arms unable to move, or move evenly  S - Speech is slurred or non-existent  T - Time - CALL 911 as soon as signs and symptoms begin - DO NOT go to bed or wait to see if you get better - TIME IS BRAIN. Warning Signs of HEART ATTACK   Call 911 if you have these symptoms:    Chest discomfort. Most heart attacks involve discomfort in the center of the chest that lasts more than a few minutes, or that goes away and comes back. It can feel like uncomfortable pressure, squeezing, fullness, or pain.      Discomfort in other areas of the upper body. Symptoms can include pain or discomfort in one or both arms, the back, neck, jaw, or stomach. Shortness of breath with or without chest discomfort. Other signs may include breaking out in a cold sweat, nausea, or lightheadedness. Don't wait more than five minutes to call 911 - MINUTES MATTER! Fast action can save your life. Calling 911 is almost always the fastest way to get lifesaving treatment. Emergency Medical Services staff can begin treatment when they arrive -- up to an hour sooner than if someone gets to the hospital by car. Learning About Coronavirus [phone])  Coronavirus [phone]): Overview  What is coronavirus (COVID-19)? The coronavirus disease (COVID-19) is caused by a virus. It is an illness that was first found in [place], in December 2019. It has since spread worldwide. The virus can cause fever, cough, and trouble breathing. In severe cases, it can cause pneumonia and make it hard to breathe without help. It can cause death. Coronaviruses are a large group of viruses. They cause the common cold. They also cause more serious illnesses like Middle East respiratory syndrome (MERS) and severe acute respiratory syndrome (SARS). COVID-19 is caused by a novel coronavirus. That means it's a new type that has not been seen in people before. This virus spreads person-to-person through droplets from coughing and sneezing. It can also spread when you are close to someone who is infected. And it can spread when you touch something that has the virus on it, such as a doorknob or a tabletop. What can you do to protect yourself from coronavirus (COVID-19)? The best way to protect yourself from getting sick is to: Avoid areas where there is an outbreak. Avoid contact with people who may be infected. Wash your hands often with soap or alcohol-based hand sanitizers. Avoid crowds and try to stay at least 6 feet away from other people.   Wash your hands often, especially after you cough or sneeze. Use soap and water, and scrub for at least 20 seconds. If soap and water aren't available, use an alcohol-based hand . Avoid touching your mouth, nose, and eyes. What can you do to avoid spreading the virus to others? To help avoid spreading the virus to others:  Cover your mouth with a tissue when you cough or sneeze. Then throw the tissue in the trash. Use a disinfectant to clean things that you touch often. Stay home if you are sick or have been exposed to the virus. Don't go to school, work, or public areas. And don't use public transportation. If you are sick:  Leave your home only if you need to get medical care. But call the doctor's office first so they know you're coming. And wear a face mask, if you have one. If you have a face mask, wear it whenever you're around other people. It can help stop the spread of the virus when you cough or sneeze. Clean and disinfect your home every day. Use household  and disinfectant wipes or sprays. Take special care to clean things that you grab with your hands. These include doorknobs, remote controls, phones, and handles on your refrigerator and microwave. And don't forget countertops, tabletops, bathrooms, and computer keyboards. When to call for help  Call 911 anytime you think you may need emergency care. For example, call if:  You have severe trouble breathing. (You can't talk at all.)  You have constant chest pain or pressure. You are severely dizzy or lightheaded. You are confused or can't think clearly. Your face and lips have a blue color. You pass out (lose consciousness) or are very hard to wake up. Call your doctor now if you develop symptoms such as:  Shortness of breath. Fever. Cough. If you need to get care, call ahead to the doctor's office for instructions before you go. Make sure you wear a face mask, if you have one, to prevent exposing other people to the virus.   Where can you get the latest information? The following health organizations are tracking and studying this virus. Their websites contain the most up-to-date information. Rc Castillo also learn what to do if you think you may have been exposed to the virus. U.S. Centers for Disease Control and Prevention (CDC): The CDC provides updated news about the disease and travel advice. The website also tells you how to prevent the spread of infection. www.cdc.gov  World Health Organization [facility]): WHO offers information about the virus outbreaks. WHO also has travel advice. www.who.int  Current as of: April 1, 2020               Content Version: 12.4  © 9166-7158 [employer], [employer]orporated. Care instructions adapted under license by your healthcare professional. If you have questions about a medical condition or this instruction, always ask your healthcare professional. [address] any warranty or liability for your use of this information. AT THE COMPLETION OF DISCHARGE INSTRUCTION REVIEW, WE VERIFY:  The discharge information has been reviewed with the patient and caregiver. Questions have been asked and answered meeting patient and caregiver expectations. The patient and caregiver verbalized understanding.     Your discharge nurse was Kia Saavedra RN-BC       Board Certified - Pain Management      CONTENTS FOUND IN YOUR DISCHARGE ENVELOPE:  [x]     Surgeon and Hospital Discharge Instructions  []     [facility] Care Provider Card  []     Medication & Side Effect Guide            (your newly prescribed medications have been marked/highlighted showing the most common side effects from the classes of drugs on your prescriptions)  []     Medication Prescription(s) x 0 ( [] These have been sent electronically to your pharmacy by your surgeon,   - OR -       your surgeon has already provided these to you during a previous/pre-op office visit)  []     Pain block and/or block with On-Q Catheter from Anesthesia Service (information included in your instructions above)        []    [facility] Education Information  []     Physical Therapy Prescription  []     Follow-up Appointment Cards  []     Surgery-related Pictures/Media  []     Medical device information sheets/pamphlets from their    []     School/work excuse note. []     /parent work excuse note. The following personal items collected during your admission for safe keeping are returned to you:     Dental Appliance: Dental Appliances: None  Vi celina:    Hearing Aid:    Jewelry: Jewelry: None  Clothing: Clothing: Shirt, Pants, Undergarments, Footwear, Jacket/Coat  Other Valuables:  Other Valuables: None  Valuables sent to safe:

## 2022-11-16 NOTE — PROGRESS NOTES
[employer] called wanting to know if the sample they received was from out patient, inpatient, or our  hospital patient. The date of procedure was 10/26/22. I called them back and I let them know it was surgery and it was outpatient. They were appreciative.

## 2022-11-16 NOTE — ANESTHESIA POSTPROCEDURE EVALUATION
Procedure(s):  RE EXCISION RIGHT BREAST LUMPECTOMY. MAC    Anesthesia Post Evaluation      Multimodal analgesia: multimodal analgesia not used between 6 hours prior to anesthesia start to PACU discharge  Patient location during evaluation: PACU  Patient participation: complete - patient participated  Level of consciousness: awake  Pain management: adequate  Airway patency: patent  Anesthetic complications: no  Cardiovascular status: acceptable, blood pressure returned to baseline and hemodynamically stable  Respiratory status: acceptable  Hydration status: acceptable  Post anesthesia nausea and vomiting:  controlled  Final Post Anesthesia Temperature Assessment:  Normothermia (36.0-37.5 degrees C)      INITIAL Post-op Vital signs:   Vitals Value Taken Time   /66 11/16/22 1525   Temp 37 °C (98.6 °F) 11/16/22 1507   Pulse 74 11/16/22 1528   Resp 21 11/16/22 1528   SpO2 97 % 11/16/22 1528   Vitals shown include unvalidated device data.

## 2022-11-16 NOTE — INTERVAL H&P NOTE
Update History & Physical    The Patient's History and Physical of November 20, 2022   was reviewed with the patient and I examined the patient. There was no change. The surgical site was confirmed by the patient and me. Re-excision LEFT anterior and inferior margins. Plan:  The risk, benefits, expected outcome, and alternative to the recommended procedure have been discussed with the patient. Patient understands and wants to proceed with the procedure.     Electronically signed by Shaniqua Anderson MD on 11/16/2022 at 11:42 AM

## 2022-11-16 NOTE — OP NOTES
[facility] [place] [address] [address]    Name: Ben Shrestha  :   Re-excision lumpectomy  Operative Report    Date of Surgery: 2022   Preoperative Diagnosis:  right breast cancer, LOQ  Postoperative Diagnosis: right breast cancer  Surgeon: Dr Iron Aguilar  Anesthesia: MAC  Procedure:  Re-excision right lumpectomy site, anterior and inferior margins  Indication: DCIS close to anterior and inferior margins  Procedure in Detail:  The patient was sedated with IV sedation. The right breast was prepped and draped in the usual sterile manner. The lumpectomy scar was in the lateral breast, 9:00,   The scar was re-opened and serous fluid was drained. The anterior and inferior margins were excised using sharp dissection and electrocautery. The specimens were oriented with sutures and sent to pathology. Hemostasis was controlled with electrocautery. Subcutaneous tissues were reapproximated with 3-0 Vicryl suture. The skin was closed with a running 4-0 Monocryl suture. The wound was dressed with steristrips. The patient was awakened and taken to the recovery room. Sponge, needle and instrument counts were reported to be correct.     Findings:  small seroma cavity  Estimated Blood Loss:  5 ml  Specimens:   ID Type Source Tests Collected by Time Destination   1 : inferior margin right breast Preservative Breast  Liz Salinas MD 2022 1434 Pathology   2 : anterior margin right breast Preservative Breast  Liz Salinas MD 2022 1442 Pathology      Complications: none  Implants: none  Assistant: Jordan Talley SA  Signed By: Anay Davila MD

## 2022-11-16 NOTE — PERIOP NOTES
POST ANESTHESIA CARE    DISCHARGE / TRANSFER NOTE  Swati Meng was:    [x] discharged        via   [x] Wheelchair          to [x] Private Vehicle     [] transferred   [] Carried   [] Taxi / Vehicle \"for Hire\"  [] Walk out  [] Ambulance / Medical Transportation   [] Stretcher  [] Hospital room _**_          [] Bed      Patient was escorted by:      [x] Nurse   [] Volunteer [] Transporter / Technician  [] Parent      [] Spouse / Family /      Patient verbalized     [x] appreciation and was very pleased with care received   [] frustration with care received       throughout their stay. Patient was discharged in     [x] pleasant mood  [] sad mood  [] mad mood . Pain at discharge/transfer was      0  /10. Discharge, medication and follow-up instructions were verbalized as understood prior to discharge  (if applicable for same-day procedures being discharged.)    All personal belongings have been returned to patient, and patient/family verbally confirm receiving belongings as all present.

## 2022-11-17 ENCOUNTER — HOSPITAL ENCOUNTER (OUTPATIENT)
Dept: RADIATION THERAPY | Age: 67
Discharge: HOME | End: 2022-11-17

## 2022-11-22 ENCOUNTER — TELEPHONE (OUTPATIENT)
Dept: SURGERY | Age: 67
End: 2022-11-22

## 2022-11-22 NOTE — TELEPHONE ENCOUNTER
Called patient POD#8  Margins are clear  Oncotype 22.   No chemo  Follow up with med onc and rad onc

## 2022-12-01 ENCOUNTER — TELEPHONE (OUTPATIENT)
Dept: ONCOLOGY | Age: 67
End: 2022-12-01

## 2022-12-01 RX ORDER — ANASTROZOLE 1 MG/1
1 TABLET ORAL DAILY
Qty: 90 TABLET | Refills: 3 | Status: SHIPPED | OUTPATIENT
Start: 2022-12-01

## 2023-05-12 ENCOUNTER — HOSPITAL ENCOUNTER (OUTPATIENT)
Facility: HOSPITAL | Age: 68
Discharge: HOME | End: 2023-05-15

## 2024-12-30 ENCOUNTER — APPOINTMENT (OUTPATIENT)
Facility: HOSPITAL | Age: 69
DRG: 193 | End: 2024-12-30
Payer: MEDICARE

## 2024-12-30 ENCOUNTER — HOSPITAL ENCOUNTER (INPATIENT)
Facility: HOSPITAL | Age: 69
LOS: 2 days | Discharge: HOME | DRG: 193 | End: 2025-01-01
Attending: EMERGENCY MEDICINE | Admitting: INTERNAL MEDICINE
Payer: MEDICARE

## 2024-12-30 ENCOUNTER — APPOINTMENT (OUTPATIENT)
Facility: HOSPITAL | Age: 69
DRG: 193 | End: 2024-12-30
Attending: EMERGENCY MEDICINE
Payer: MEDICARE

## 2024-12-30 DIAGNOSIS — R79.89 ELEVATED TROPONIN: ICD-10-CM

## 2024-12-30 DIAGNOSIS — N17.9 AKI (ACUTE KIDNEY INJURY) (HCC): ICD-10-CM

## 2024-12-30 DIAGNOSIS — A41.9 SEPTICEMIA (HCC): ICD-10-CM

## 2024-12-30 DIAGNOSIS — J18.9 PNEUMONIA OF LEFT LOWER LOBE DUE TO INFECTIOUS ORGANISM: ICD-10-CM

## 2024-12-30 DIAGNOSIS — E87.6 HYPOKALEMIA: ICD-10-CM

## 2024-12-30 DIAGNOSIS — J96.91 RESPIRATORY FAILURE WITH HYPOXIA, UNSPECIFIED CHRONICITY: Primary | ICD-10-CM

## 2024-12-30 DIAGNOSIS — I42.9 CARDIOMYOPATHY, UNSPECIFIED TYPE (HCC): ICD-10-CM

## 2024-12-30 PROBLEM — J96.01 ACUTE HYPOXIC RESPIRATORY FAILURE: Status: ACTIVE | Noted: 2024-12-30

## 2024-12-30 LAB
ALBUMIN SERPL-MCNC: 2.9 G/DL (ref 3.5–5)
ALBUMIN/GLOB SERPL: 0.6 (ref 1.1–2.2)
ALP SERPL-CCNC: 45 U/L (ref 45–117)
ALT SERPL-CCNC: 16 U/L (ref 12–78)
ANION GAP SERPL CALC-SCNC: 12 MMOL/L (ref 2–12)
APPEARANCE UR: ABNORMAL
AST SERPL-CCNC: 38 U/L (ref 15–37)
BACTERIA URNS QL MICRO: ABNORMAL /HPF
BASOPHILS # BLD: 0 K/UL (ref 0–0.1)
BASOPHILS NFR BLD: 0 % (ref 0–1)
BILIRUB SERPL-MCNC: 0.5 MG/DL (ref 0.2–1)
BILIRUB UR QL CFM: NEGATIVE
BUN SERPL-MCNC: 32 MG/DL (ref 6–20)
BUN/CREAT SERPL: 15 (ref 12–20)
CALCIUM SERPL-MCNC: 8.6 MG/DL (ref 8.5–10.1)
CHLORIDE SERPL-SCNC: 94 MMOL/L (ref 97–108)
CO2 SERPL-SCNC: 21 MMOL/L (ref 21–32)
COLOR UR: ABNORMAL
CREAT SERPL-MCNC: 2.19 MG/DL (ref 0.55–1.02)
CREAT UR-MCNC: 169 MG/DL
D DIMER PPP FEU-MCNC: 1.45 MG/L FEU (ref 0–0.65)
DIFFERENTIAL METHOD BLD: ABNORMAL
EOSINOPHIL # BLD: 0 K/UL (ref 0–0.4)
EOSINOPHIL NFR BLD: 0 % (ref 0–7)
EPITH CASTS URNS QL MICRO: ABNORMAL /LPF
ERYTHROCYTE [DISTWIDTH] IN BLOOD BY AUTOMATED COUNT: 12.3 % (ref 11.5–14.5)
FLUAV RNA SPEC QL NAA+PROBE: NOT DETECTED
FLUBV RNA SPEC QL NAA+PROBE: NOT DETECTED
GLOBULIN SER CALC-MCNC: 4.5 G/DL (ref 2–4)
GLUCOSE SERPL-MCNC: 289 MG/DL (ref 65–100)
GLUCOSE UR STRIP.AUTO-MCNC: NEGATIVE MG/DL
HCT VFR BLD AUTO: 36.4 % (ref 35–47)
HGB BLD-MCNC: 12.5 G/DL (ref 11.5–16)
HGB UR QL STRIP: ABNORMAL
IMM GRANULOCYTES # BLD AUTO: 0 K/UL
IMM GRANULOCYTES NFR BLD AUTO: 0 %
KETONES UR QL STRIP.AUTO: ABNORMAL MG/DL
LACTATE BLD-SCNC: 1.46 MMOL/L (ref 0.4–2)
LACTATE BLD-SCNC: 2.22 MMOL/L (ref 0.4–2)
LEUKOCYTE ESTERASE UR QL STRIP.AUTO: ABNORMAL
LYMPHOCYTES # BLD: 0.3 K/UL (ref 0.8–3.5)
LYMPHOCYTES NFR BLD: 3 % (ref 12–49)
MCH RBC QN AUTO: 29.2 PG (ref 26–34)
MCHC RBC AUTO-ENTMCNC: 34.3 G/DL (ref 30–36.5)
MCV RBC AUTO: 85 FL (ref 80–99)
METAMYELOCYTES NFR BLD MANUAL: 1 %
MONOCYTES # BLD: 0.2 K/UL (ref 0–1)
MONOCYTES NFR BLD: 2 % (ref 5–13)
NEUTS BAND NFR BLD MANUAL: 76 % (ref 0–6)
NEUTS SEG # BLD: 9.2 K/UL (ref 1.8–8)
NEUTS SEG NFR BLD: 18 % (ref 32–75)
NITRITE UR QL STRIP.AUTO: NEGATIVE
NRBC # BLD: 0 K/UL (ref 0–0.01)
NRBC BLD-RTO: 0 PER 100 WBC
NT PRO BNP: 251 PG/ML
PH UR STRIP: 5 (ref 5–8)
PLATELET # BLD AUTO: 157 K/UL (ref 150–400)
PLATELET COMMENT: ABNORMAL
PMV BLD AUTO: 10.8 FL (ref 8.9–12.9)
POTASSIUM SERPL-SCNC: 2.9 MMOL/L (ref 3.5–5.1)
PROCALCITONIN SERPL-MCNC: 0.82 NG/ML
PROCALCITONIN SERPL-MCNC: 1.26 NG/ML
PROT SERPL-MCNC: 7.4 G/DL (ref 6.4–8.2)
PROT UR STRIP-MCNC: 100 MG/DL
RBC # BLD AUTO: 4.28 M/UL (ref 3.8–5.2)
RBC #/AREA URNS HPF: ABNORMAL /HPF (ref 0–5)
RBC MORPH BLD: ABNORMAL
SARS-COV-2 RNA RESP QL NAA+PROBE: NOT DETECTED
SODIUM SERPL-SCNC: 127 MMOL/L (ref 136–145)
SOURCE: NORMAL
SP GR UR REFRACTOMETRY: 1.03 (ref 1–1.03)
SPECIMEN HOLD: NORMAL
TROPONIN I SERPL HS-MCNC: 236 NG/L (ref 0–51)
TROPONIN I SERPL HS-MCNC: 264 NG/L (ref 0–51)
URINE CULTURE IF INDICATED: ABNORMAL
UROBILINOGEN UR QL STRIP.AUTO: 1 EU/DL (ref 0.2–1)
WBC # BLD AUTO: 9.8 K/UL (ref 3.6–11)
WBC URNS QL MICRO: ABNORMAL /HPF (ref 0–4)

## 2024-12-30 PROCEDURE — 6360000002 HC RX W HCPCS: Performed by: INTERNAL MEDICINE

## 2024-12-30 PROCEDURE — 81001 URINALYSIS AUTO W/SCOPE: CPT

## 2024-12-30 PROCEDURE — 83605 ASSAY OF LACTIC ACID: CPT

## 2024-12-30 PROCEDURE — 96365 THER/PROPH/DIAG IV INF INIT: CPT

## 2024-12-30 PROCEDURE — 87636 SARSCOV2 & INF A&B AMP PRB: CPT

## 2024-12-30 PROCEDURE — 84145 PROCALCITONIN (PCT): CPT

## 2024-12-30 PROCEDURE — 82570 ASSAY OF URINE CREATININE: CPT

## 2024-12-30 PROCEDURE — 2580000003 HC RX 258: Performed by: INTERNAL MEDICINE

## 2024-12-30 PROCEDURE — 84484 ASSAY OF TROPONIN QUANT: CPT

## 2024-12-30 PROCEDURE — 83036 HEMOGLOBIN GLYCOSYLATED A1C: CPT

## 2024-12-30 PROCEDURE — 96375 TX/PRO/DX INJ NEW DRUG ADDON: CPT

## 2024-12-30 PROCEDURE — 6370000000 HC RX 637 (ALT 250 FOR IP): Performed by: INTERNAL MEDICINE

## 2024-12-30 PROCEDURE — 71250 CT THORAX DX C-: CPT

## 2024-12-30 PROCEDURE — 87086 URINE CULTURE/COLONY COUNT: CPT

## 2024-12-30 PROCEDURE — 1100000000 HC RM PRIVATE

## 2024-12-30 PROCEDURE — 83880 ASSAY OF NATRIURETIC PEPTIDE: CPT

## 2024-12-30 PROCEDURE — 86738 MYCOPLASMA ANTIBODY: CPT

## 2024-12-30 PROCEDURE — 6370000000 HC RX 637 (ALT 250 FOR IP): Performed by: EMERGENCY MEDICINE

## 2024-12-30 PROCEDURE — 76770 US EXAM ABDO BACK WALL COMP: CPT

## 2024-12-30 PROCEDURE — 99285 EMERGENCY DEPT VISIT HI MDM: CPT

## 2024-12-30 PROCEDURE — 84300 ASSAY OF URINE SODIUM: CPT

## 2024-12-30 PROCEDURE — 2500000003 HC RX 250 WO HCPCS: Performed by: INTERNAL MEDICINE

## 2024-12-30 PROCEDURE — 84443 ASSAY THYROID STIM HORMONE: CPT

## 2024-12-30 PROCEDURE — 80053 COMPREHEN METABOLIC PANEL: CPT

## 2024-12-30 PROCEDURE — 2500000003 HC RX 250 WO HCPCS: Performed by: EMERGENCY MEDICINE

## 2024-12-30 PROCEDURE — 87449 NOS EACH ORGANISM AG IA: CPT

## 2024-12-30 PROCEDURE — 71045 X-RAY EXAM CHEST 1 VIEW: CPT

## 2024-12-30 PROCEDURE — 85379 FIBRIN DEGRADATION QUANT: CPT

## 2024-12-30 PROCEDURE — 6360000002 HC RX W HCPCS: Performed by: EMERGENCY MEDICINE

## 2024-12-30 PROCEDURE — 93005 ELECTROCARDIOGRAM TRACING: CPT | Performed by: EMERGENCY MEDICINE

## 2024-12-30 PROCEDURE — 85025 COMPLETE CBC W/AUTO DIFF WBC: CPT

## 2024-12-30 PROCEDURE — 36415 COLL VENOUS BLD VENIPUNCTURE: CPT

## 2024-12-30 PROCEDURE — 80061 LIPID PANEL: CPT

## 2024-12-30 PROCEDURE — 2580000003 HC RX 258: Performed by: EMERGENCY MEDICINE

## 2024-12-30 PROCEDURE — 87040 BLOOD CULTURE FOR BACTERIA: CPT

## 2024-12-30 PROCEDURE — 84439 ASSAY OF FREE THYROXINE: CPT

## 2024-12-30 PROCEDURE — 84540 ASSAY OF URINE/UREA-N: CPT

## 2024-12-30 RX ORDER — IPRATROPIUM BROMIDE AND ALBUTEROL SULFATE 2.5; .5 MG/3ML; MG/3ML
1 SOLUTION RESPIRATORY (INHALATION)
Status: DISCONTINUED | OUTPATIENT
Start: 2024-12-31 | End: 2025-01-01

## 2024-12-30 RX ORDER — GUAIFENESIN 600 MG/1
600 TABLET, EXTENDED RELEASE ORAL 2 TIMES DAILY
Status: DISCONTINUED | OUTPATIENT
Start: 2024-12-30 | End: 2025-01-01 | Stop reason: HOSPADM

## 2024-12-30 RX ORDER — SODIUM CHLORIDE 9 MG/ML
INJECTION, SOLUTION INTRAVENOUS CONTINUOUS
Status: DISCONTINUED | OUTPATIENT
Start: 2024-12-30 | End: 2025-01-01

## 2024-12-30 RX ORDER — ASPIRIN 81 MG/1
81 TABLET, CHEWABLE ORAL ONCE
Status: COMPLETED | OUTPATIENT
Start: 2024-12-30 | End: 2024-12-30

## 2024-12-30 RX ORDER — BENZONATATE 100 MG/1
100 CAPSULE ORAL 3 TIMES DAILY PRN
Status: DISCONTINUED | OUTPATIENT
Start: 2024-12-30 | End: 2025-01-01 | Stop reason: HOSPADM

## 2024-12-30 RX ORDER — SODIUM CHLORIDE 0.9 % (FLUSH) 0.9 %
5-40 SYRINGE (ML) INJECTION EVERY 12 HOURS SCHEDULED
Status: DISCONTINUED | OUTPATIENT
Start: 2024-12-30 | End: 2025-01-01 | Stop reason: HOSPADM

## 2024-12-30 RX ORDER — POLYETHYLENE GLYCOL 3350 17 G/17G
17 POWDER, FOR SOLUTION ORAL DAILY PRN
Status: DISCONTINUED | OUTPATIENT
Start: 2024-12-30 | End: 2025-01-01 | Stop reason: HOSPADM

## 2024-12-30 RX ORDER — POTASSIUM CHLORIDE 7.45 MG/ML
10 INJECTION INTRAVENOUS
Status: COMPLETED | OUTPATIENT
Start: 2024-12-30 | End: 2024-12-31

## 2024-12-30 RX ORDER — SODIUM CHLORIDE 0.9 % (FLUSH) 0.9 %
5-40 SYRINGE (ML) INJECTION PRN
Status: DISCONTINUED | OUTPATIENT
Start: 2024-12-30 | End: 2025-01-01 | Stop reason: HOSPADM

## 2024-12-30 RX ORDER — ONDANSETRON 2 MG/ML
4 INJECTION INTRAMUSCULAR; INTRAVENOUS EVERY 6 HOURS PRN
Status: DISCONTINUED | OUTPATIENT
Start: 2024-12-30 | End: 2025-01-01 | Stop reason: HOSPADM

## 2024-12-30 RX ORDER — ACETAMINOPHEN 650 MG/1
650 SUPPOSITORY RECTAL EVERY 6 HOURS PRN
Status: DISCONTINUED | OUTPATIENT
Start: 2024-12-30 | End: 2025-01-01 | Stop reason: HOSPADM

## 2024-12-30 RX ORDER — 0.9 % SODIUM CHLORIDE 0.9 %
30 INTRAVENOUS SOLUTION INTRAVENOUS ONCE
Status: COMPLETED | OUTPATIENT
Start: 2024-12-30 | End: 2024-12-30

## 2024-12-30 RX ORDER — ACETAMINOPHEN 325 MG/1
650 TABLET ORAL EVERY 6 HOURS PRN
Status: DISCONTINUED | OUTPATIENT
Start: 2024-12-30 | End: 2025-01-01 | Stop reason: HOSPADM

## 2024-12-30 RX ORDER — ENOXAPARIN SODIUM 100 MG/ML
30 INJECTION SUBCUTANEOUS DAILY
Status: DISCONTINUED | OUTPATIENT
Start: 2024-12-30 | End: 2024-12-31

## 2024-12-30 RX ORDER — SODIUM CHLORIDE 9 MG/ML
INJECTION, SOLUTION INTRAVENOUS PRN
Status: DISCONTINUED | OUTPATIENT
Start: 2024-12-30 | End: 2025-01-01 | Stop reason: HOSPADM

## 2024-12-30 RX ORDER — ONDANSETRON 4 MG/1
4 TABLET, ORALLY DISINTEGRATING ORAL EVERY 8 HOURS PRN
Status: DISCONTINUED | OUTPATIENT
Start: 2024-12-30 | End: 2025-01-01 | Stop reason: HOSPADM

## 2024-12-30 RX ADMIN — SODIUM CHLORIDE, PRESERVATIVE FREE 10 ML: 5 INJECTION INTRAVENOUS at 22:00

## 2024-12-30 RX ADMIN — ASPIRIN 81 MG: 81 TABLET, CHEWABLE ORAL at 19:03

## 2024-12-30 RX ADMIN — GUAIFENESIN 600 MG: 600 TABLET ORAL at 21:49

## 2024-12-30 RX ADMIN — POTASSIUM BICARBONATE 40 MEQ: 782 TABLET, EFFERVESCENT ORAL at 20:12

## 2024-12-30 RX ADMIN — AZITHROMYCIN MONOHYDRATE 500 MG: 500 INJECTION, POWDER, LYOPHILIZED, FOR SOLUTION INTRAVENOUS at 18:30

## 2024-12-30 RX ADMIN — WATER 1000 MG: 1 INJECTION INTRAMUSCULAR; INTRAVENOUS; SUBCUTANEOUS at 18:21

## 2024-12-30 RX ADMIN — ENOXAPARIN SODIUM 30 MG: 100 INJECTION SUBCUTANEOUS at 21:51

## 2024-12-30 RX ADMIN — SODIUM CHLORIDE 1959 ML: 9 INJECTION, SOLUTION INTRAVENOUS at 18:19

## 2024-12-30 RX ADMIN — POTASSIUM CHLORIDE 10 MEQ: 7.45 INJECTION INTRAVENOUS at 21:55

## 2024-12-30 RX ADMIN — SODIUM CHLORIDE: 9 INJECTION, SOLUTION INTRAVENOUS at 23:07

## 2024-12-30 ASSESSMENT — PAIN - FUNCTIONAL ASSESSMENT
PAIN_FUNCTIONAL_ASSESSMENT: NONE - DENIES PAIN
PAIN_FUNCTIONAL_ASSESSMENT: NONE - DENIES PAIN

## 2024-12-30 NOTE — ED TRIAGE NOTES
Patient arrives to ed via pov. Pt c/o fever, \"[facility] sat[facility]\", cough, x 4 days. Pt denies shortness of breath, chest pain or any further complaints.

## 2024-12-30 NOTE — ED PROVIDER NOTES
[facility] EMERGENCY DEPT  EMERGENCY DEPARTMENT ENCOUNTER      Pt Name: Tierney Alcantara  MRN: [id number]  Birthdate 1955  Date of evaluation: 12/30/2024  Provider: Chuck Preciado MD      HISTORY OF PRESENT ILLNESS      HPI  69-year-old female with a past medical history of hypertension and breast cancer status postlumpectomy presenting to the emergency department due to fever and cough for 4 days.  Patient says she has had a fever as high as 104.  She went to a local urgent care and she was found to be hypoxic.  They tested her for COVID and the flu and it was negative.  They did a chest x-ray and it was read as negative but she was sent to the emergency department.  Patient denies shortness of breath.  No chest pain.  She says her  is sick as well.  Denies any chronic lung issues or smoking.  No leg swelling orthopnea      Nursing Notes were reviewed.    REVIEW OF SYSTEMS         Review of Systems  All systems reviewed were negative less otherwise document in the HPI      PAST MEDICAL HISTORY     Past Medical History:   Diagnosis Date    Asthma     exercise induced    Breast cancer (HCC)     Left breast 2010 Right breast 2022    Hypertension     Mitral valve prolapse     Pneumonia eosinophilous 01/01/2004    Skin cancer 2022         SURGICAL HISTORY       Past Surgical History:   Procedure Laterality Date    BREAST LUMPECTOMY Right 10/26/2022    RIGHT BREAST LUMPECTOMY AND RIGHT BREAST SENTINEL NODE BIOPSY WITH BLUE DYE performed by Kaitlin Carlson MD at [facility] AMBULATORY OR    BREAST LUMPECTOMY Right 11/16/2022    RE EXCISION RIGHT BREAST LUMPECTOMY performed by Kaitlin Carlson MD at [facility] AMBULATORY OR    BREAST LUMPECTOMY  07/01/2010    LEFT    CATARACT REMOVAL Right     getting left eye done the week of 11/20/22    CYST REMOVAL      Ganglion    HEENT Bilateral     Pterygium    LIPOMA RESECTION  01/01/2000    left leg    MOHS SURGERY Left 2022    face-Cheek    US BREAST BIOPSY W LOC DEVICE 1ST LESION RIGHT  Critical care time (minutes):  60    Critical care time was exclusive of:  Separately billable procedures and treating other patients    Critical care was necessary to treat or prevent imminent or life-threatening deterioration of the following conditions: Acute hypoxemic respiratory failure, sepsis, elevated troponin.    Critical care was time spent personally by me on the following activities:  Blood draw for specimens, development of treatment plan with patient or surrogate, evaluation of patient's response to treatment, examination of patient, obtaining history from patient or surrogate, ordering and performing treatments and interventions, ordering and review of laboratory studies, ordering and review of radiographic studies, pulse oximetry, re-evaluation of patient's condition and review of old charts    I assumed direction of critical care for this patient from another provider in my specialty: no      Care discussed with: admitting provider              (Please note that portions of this note were completed with a voice recognition program.  Efforts were made to edit the dictations but occasionally words are mis-transcribed.)    Chuck Preciado MD (electronically signed)  Emergency Attending Physician              Chuck Preciado MD  12/30/24 1937

## 2024-12-31 LAB
ANION GAP SERPL CALC-SCNC: 4 MMOL/L (ref 2–12)
BASOPHILS # BLD: 0 K/UL (ref 0–0.1)
BASOPHILS NFR BLD: 0 % (ref 0–1)
BUN SERPL-MCNC: 20 MG/DL (ref 6–20)
BUN/CREAT SERPL: 22 (ref 12–20)
CALCIUM SERPL-MCNC: 8 MG/DL (ref 8.5–10.1)
CHLORIDE SERPL-SCNC: 110 MMOL/L (ref 97–108)
CHOLEST SERPL-MCNC: 136 MG/DL
CO2 SERPL-SCNC: 23 MMOL/L (ref 21–32)
CREAT SERPL-MCNC: 0.9 MG/DL (ref 0.55–1.02)
DIFFERENTIAL METHOD BLD: ABNORMAL
EKG ATRIAL RATE: 97 BPM
EKG DIAGNOSIS: NORMAL
EKG P AXIS: 81 DEGREES
EKG P-R INTERVAL: 124 MS
EKG Q-T INTERVAL: 352 MS
EKG QRS DURATION: 76 MS
EKG QTC CALCULATION (BAZETT): 447 MS
EKG R AXIS: 23 DEGREES
EKG T AXIS: 37 DEGREES
EKG VENTRICULAR RATE: 97 BPM
EOSINOPHIL # BLD: 0 K/UL (ref 0–0.4)
EOSINOPHIL NFR BLD: 0 % (ref 0–7)
ERYTHROCYTE [DISTWIDTH] IN BLOOD BY AUTOMATED COUNT: 12.4 % (ref 11.5–14.5)
EST. AVERAGE GLUCOSE BLD GHB EST-MCNC: 120 MG/DL
GLUCOSE SERPL-MCNC: 146 MG/DL (ref 65–100)
HBA1C MFR BLD: 5.8 % (ref 4–5.6)
HCT VFR BLD AUTO: 33.9 % (ref 35–47)
HDLC SERPL-MCNC: 51 MG/DL
HDLC SERPL: 2.7 (ref 0–5)
HGB BLD-MCNC: 11.4 G/DL (ref 11.5–16)
IMM GRANULOCYTES # BLD AUTO: 0 K/UL (ref 0–0.04)
IMM GRANULOCYTES NFR BLD AUTO: 0 % (ref 0–0.5)
LDLC SERPL CALC-MCNC: 68.8 MG/DL (ref 0–100)
LYMPHOCYTES # BLD: 0.6 K/UL (ref 0.8–3.5)
LYMPHOCYTES NFR BLD: 8 % (ref 12–49)
MCH RBC QN AUTO: 29.1 PG (ref 26–34)
MCHC RBC AUTO-ENTMCNC: 33.6 G/DL (ref 30–36.5)
MCV RBC AUTO: 86.5 FL (ref 80–99)
MONOCYTES # BLD: 0.2 K/UL (ref 0–1)
MONOCYTES NFR BLD: 3 % (ref 5–13)
NEUTS SEG # BLD: 7.1 K/UL (ref 1.8–8)
NEUTS SEG NFR BLD: 89 % (ref 32–75)
NRBC # BLD: 0 K/UL (ref 0–0.01)
NRBC BLD-RTO: 0 PER 100 WBC
PHOSPHATE SERPL-MCNC: 1.3 MG/DL (ref 2.6–4.7)
PLATELET # BLD AUTO: 131 K/UL (ref 150–400)
PMV BLD AUTO: 10.8 FL (ref 8.9–12.9)
POTASSIUM SERPL-SCNC: 3.9 MMOL/L (ref 3.5–5.1)
RBC # BLD AUTO: 3.92 M/UL (ref 3.8–5.2)
RBC MORPH BLD: ABNORMAL
SODIUM SERPL-SCNC: 137 MMOL/L (ref 136–145)
SODIUM UR-SCNC: 8 MMOL/L
T4 FREE SERPL-MCNC: 2.1 NG/DL (ref 0.8–1.5)
TRIGL SERPL-MCNC: 81 MG/DL
TSH SERPL DL<=0.05 MIU/L-ACNC: 0.55 UIU/ML (ref 0.36–3.74)
UUN UR-MCNC: 458 MG/DL
VLDLC SERPL CALC-MCNC: 16.2 MG/DL
WBC # BLD AUTO: 7.9 K/UL (ref 3.6–11)

## 2024-12-31 PROCEDURE — 94761 N-INVAS EAR/PLS OXIMETRY MLT: CPT

## 2024-12-31 PROCEDURE — 6370000000 HC RX 637 (ALT 250 FOR IP): Performed by: INTERNAL MEDICINE

## 2024-12-31 PROCEDURE — 97165 OT EVAL LOW COMPLEX 30 MIN: CPT | Performed by: OCCUPATIONAL THERAPIST

## 2024-12-31 PROCEDURE — 99223 1ST HOSP IP/OBS HIGH 75: CPT | Performed by: INTERNAL MEDICINE

## 2024-12-31 PROCEDURE — 2580000003 HC RX 258: Performed by: INTERNAL MEDICINE

## 2024-12-31 PROCEDURE — 85025 COMPLETE CBC W/AUTO DIFF WBC: CPT

## 2024-12-31 PROCEDURE — 93010 ELECTROCARDIOGRAM REPORT: CPT | Performed by: INTERNAL MEDICINE

## 2024-12-31 PROCEDURE — 80048 BASIC METABOLIC PNL TOTAL CA: CPT

## 2024-12-31 PROCEDURE — 97161 PT EVAL LOW COMPLEX 20 MIN: CPT

## 2024-12-31 PROCEDURE — 36415 COLL VENOUS BLD VENIPUNCTURE: CPT

## 2024-12-31 PROCEDURE — 84100 ASSAY OF PHOSPHORUS: CPT

## 2024-12-31 PROCEDURE — 2700000000 HC OXYGEN THERAPY PER DAY

## 2024-12-31 PROCEDURE — 94664 DEMO&/EVAL PT USE INHALER: CPT

## 2024-12-31 PROCEDURE — 2500000003 HC RX 250 WO HCPCS: Performed by: INTERNAL MEDICINE

## 2024-12-31 PROCEDURE — 6360000002 HC RX W HCPCS: Performed by: INTERNAL MEDICINE

## 2024-12-31 PROCEDURE — 94640 AIRWAY INHALATION TREATMENT: CPT

## 2024-12-31 PROCEDURE — 1100000000 HC RM PRIVATE

## 2024-12-31 RX ORDER — VALSARTAN 80 MG/1
160 TABLET ORAL DAILY
Status: DISCONTINUED | OUTPATIENT
Start: 2024-12-31 | End: 2024-12-31

## 2024-12-31 RX ORDER — AMLODIPINE AND VALSARTAN 5; 160 MG/1; MG/1
1 TABLET ORAL DAILY
Status: DISCONTINUED | OUTPATIENT
Start: 2025-01-01 | End: 2025-01-01 | Stop reason: HOSPADM

## 2024-12-31 RX ORDER — AMLODIPINE AND VALSARTAN 5; 160 MG/1; MG/1
1 TABLET ORAL DAILY
Status: DISCONTINUED | OUTPATIENT
Start: 2024-12-31 | End: 2024-12-31 | Stop reason: CLARIF

## 2024-12-31 RX ORDER — ENOXAPARIN SODIUM 100 MG/ML
40 INJECTION SUBCUTANEOUS DAILY
Status: DISCONTINUED | OUTPATIENT
Start: 2025-01-01 | End: 2025-01-01 | Stop reason: HOSPADM

## 2024-12-31 RX ORDER — TAMOXIFEN CITRATE 10 MG/1
10 TABLET ORAL DAILY
Status: DISCONTINUED | OUTPATIENT
Start: 2025-01-01 | End: 2025-01-01 | Stop reason: HOSPADM

## 2024-12-31 RX ORDER — TAMOXIFEN CITRATE 10 MG/1
10 TABLET ORAL DAILY
COMMUNITY

## 2024-12-31 RX ORDER — AMLODIPINE AND VALSARTAN 5; 160 MG/1; MG/1
1 TABLET ORAL DAILY
COMMUNITY

## 2024-12-31 RX ORDER — AMLODIPINE BESYLATE 5 MG/1
5 TABLET ORAL DAILY
Status: DISCONTINUED | OUTPATIENT
Start: 2024-12-31 | End: 2024-12-31

## 2024-12-31 RX ADMIN — SODIUM CHLORIDE: 9 INJECTION, SOLUTION INTRAVENOUS at 23:39

## 2024-12-31 RX ADMIN — AZITHROMYCIN MONOHYDRATE 500 MG: 500 INJECTION, POWDER, LYOPHILIZED, FOR SOLUTION INTRAVENOUS at 17:45

## 2024-12-31 RX ADMIN — IPRATROPIUM BROMIDE AND ALBUTEROL SULFATE 1 DOSE: 2.5; .5 SOLUTION RESPIRATORY (INHALATION) at 08:00

## 2024-12-31 RX ADMIN — IPRATROPIUM BROMIDE AND ALBUTEROL SULFATE 1 DOSE: 2.5; .5 SOLUTION RESPIRATORY (INHALATION) at 20:00

## 2024-12-31 RX ADMIN — WATER 2000 MG: 1 INJECTION INTRAMUSCULAR; INTRAVENOUS; SUBCUTANEOUS at 17:34

## 2024-12-31 RX ADMIN — SODIUM CHLORIDE, PRESERVATIVE FREE 10 ML: 5 INJECTION INTRAVENOUS at 20:38

## 2024-12-31 RX ADMIN — ENOXAPARIN SODIUM 30 MG: 100 INJECTION SUBCUTANEOUS at 08:13

## 2024-12-31 RX ADMIN — IPRATROPIUM BROMIDE AND ALBUTEROL SULFATE 1 DOSE: 2.5; .5 SOLUTION RESPIRATORY (INHALATION) at 11:20

## 2024-12-31 RX ADMIN — GUAIFENESIN 600 MG: 600 TABLET ORAL at 08:14

## 2024-12-31 RX ADMIN — SODIUM CHLORIDE, PRESERVATIVE FREE 10 ML: 5 INJECTION INTRAVENOUS at 08:12

## 2024-12-31 RX ADMIN — GUAIFENESIN 600 MG: 600 TABLET ORAL at 20:38

## 2024-12-31 RX ADMIN — POTASSIUM CHLORIDE 10 MEQ: 7.45 INJECTION INTRAVENOUS at 05:53

## 2024-12-31 RX ADMIN — IPRATROPIUM BROMIDE AND ALBUTEROL SULFATE 1 DOSE: 2.5; .5 SOLUTION RESPIRATORY (INHALATION) at 14:42

## 2024-12-31 RX ADMIN — POTASSIUM CHLORIDE 10 MEQ: 7.45 INJECTION INTRAVENOUS at 02:02

## 2024-12-31 RX ADMIN — POTASSIUM CHLORIDE 10 MEQ: 7.45 INJECTION INTRAVENOUS at 04:19

## 2024-12-31 NOTE — PROGRESS NOTES
OCCUPATIONAL THERAPY EVALUATION/DISCHARGE  Patient: Tierney Alcantara (69 y.o. female)  Date: 12/31/2024  Primary Diagnosis: Acute hypoxic respiratory failure [J96.01]         Precautions: General Precautions                  ASSESSMENT :  Based on the objective data below, the patient presents at an overall independent level with ADLs and functional mobility follow admission for PNA and acute hypoxic respiratory failure.  Pt with good safety awareness and no LOB.  Pt is looking forward to her trip to [place] this coming Monday.  No further skilled OT required at this time.    Functional Outcome Measure:  The patient scored 24/24 on the ADL AM-PAC outcome measure.      Further skilled acute occupational therapy is not indicated at this time.     PLAN :  Recommend with staff: up ad jarvis    Recommendation for discharge: (in order for the patient to meet his/her long term goals):   No skilled occupational therapy    Other factors to consider for discharge: no additional factors    IF patient discharges home will need the following DME: none     SUBJECTIVE:   Patient stated, “They just told me I have a broken rib from coughing so much.”    OBJECTIVE DATA SUMMARY:     Past Medical History:   Diagnosis Date    Asthma     exercise induced    Breast cancer (HCC)     Left breast 2010 Right breast 2022    Hypertension     Mitral valve prolapse     Pneumonia eosinophilous 01/01/2004    Skin cancer 2022     Past Surgical History:   Procedure Laterality Date    BREAST LUMPECTOMY Right 10/26/2022    RIGHT BREAST LUMPECTOMY AND RIGHT BREAST SENTINEL NODE BIOPSY WITH BLUE DYE performed by Kaitlin Carlson MD at [facility] AMBULATORY OR    BREAST LUMPECTOMY Right 11/16/2022    RE EXCISION RIGHT BREAST LUMPECTOMY performed by Kaitlin Carlson MD at [facility] AMBULATORY OR    BREAST LUMPECTOMY  07/01/2010    LEFT    CATARACT REMOVAL Right     getting left eye done the week of 11/20/22    CYST REMOVAL      Ganglion    HEENT Bilateral     Pterygium     Mobility and Transfers for ADLs:  Bed Mobility:     Bed Mobility Training  Bed Mobility Training: Yes  Overall Level of Assistance: Independent  Rolling: Independent  Supine to Sit: Independent  Sit to Supine: Independent  Scooting: Independent    Transfers:     Transfer Training  Transfer Training: Yes  Overall Level of Assistance: Independent  Sit to Stand: Independent  Stand to Sit: Independent  Stand Pivot Transfers: Independent  Bed to Chair: Independent  Toilet Transfer: Independent                                   Balance:      Balance  Sitting: Intact  Standing: Intact;Without support      ADL Assessment:   Feeding: Independent     Grooming: Independent  Grooming Skilled Clinical Factors: standing at sink    UE Bathing: Independent     LE Bathing: Independent     UE Dressing: Independent     LE Dressing: Independent     Toileting: Independent     Functional Mobility: Independent  Functional Mobility Skilled Clinical Factors: no AD          ADL Intervention and task modifications:    [facility] \"6 Clicks\"                                                       Daily Activity Inpatient Short Form  AM-PAC Daily Activity - Inpatient   How much help is needed for putting on and taking off regular lower body clothing?: None  How much help is needed for bathing (which includes washing, rinsing, drying)?: None  How much help is needed for toileting (which includes using toilet, bedpan, or urinal)?: None  How much help is needed for putting on and taking off regular upper body clothing?: None  How much help is needed for taking care of personal grooming?: None  How much help for eating meals?: None  AM-[facility] Inpatient Daily Activity Raw Score: 24  AM-PAC Inpatient ADL T-Scale Score : 57.54  ADL Inpatient CMS 0-100% Score: 0  ADL Inpatient CMS G-Code Modifier : CH     Interpretation of Tool:  Represents clinically-significant functional categories (i.e. Activities of daily living).  Cutoff score 39.4 (19)

## 2024-12-31 NOTE — CONSULTS
[facility] CARDIOLOGY                    Cardiology Care Note     [x]Initial Encounter     []Follow-up    Patient Name: Tierney Alcantara - :1955 - MRN:[id number]  Primary Cardiologist: None  Consulting Cardiologist: Dian Sheffield MD     Reason for encounter: Elevated troponin    HPI:       Tierney Alcantara is a 69 y.o. female with PMH significant for breast CA sp lumpectomy, HTN, asthma presented to ED with SOB + cough.    + fevers, chills, cough, SOB x 4 days.    CXR c/w CAP/PNA.  CT chest LLL PNA - coronary artery calcification present.    Lactic acid elevated @ 2.22.    EKG NSR no acute changes.    Lab Results   Component Value Date    TROPHS 236 (HH) 2024     proBNP 251.      .    Subjective:      Tierney Alcantara reports dyspnea and fatigue.     Assessment and Plan       Elevated troponin - presentation not consistent with ACS.  Likely demand/type 2 in setting of PNA, acute hypoxic respiratory failure. Heart score 4.  Will obtain echo, likely plan for outpatient ischemic evaluation once acute illness has resolved, given her risk factors, presence of coronary calcifications on CT scan.  PNA - LLL ABX per primary team  Hyponatremia - secondary to PNA  Hypokalemia - replete  ALEKSANDR - likely prerenal, IVF  Hx of breast cancer - does not appear to have had chemotherapy - rx with XRT anastrozole.  Elevated BS - check A1c to assess for DM2 as cardiac risk factor.       ____________________________________________________________    Cardiac testing  No results found for this or any previous visit.        Most recent HS troponins:  Recent Labs     24  1748 24   TROPHS 264* 236*       ECG: normal EKG, normal sinus rhythm, unchanged from previous tracings    Review of Systems:    [x]All other systems reviewed and all negative except as written in HPI    [] Patient unable to provide secondary to condition    Past Medical History:   Diagnosis Date    Asthma     exercise induced    Breast

## 2024-12-31 NOTE — PROGRESS NOTES
[facility]  [address] [address], [place] [id number] [phone]    [facility] Adult  Hospitalist Group                                                                                          Hospitalist Progress Note  Rebecca Ignacio MD        Date of Service:  2024  NAME:  Tierney Alcantara  :  1955  MRN:  [id number]      Admission Summary:   69 y.o. female with a past medical history of breast cancer presents to the emergency room due to shortness of breath.     Interval history / Subjective:   Patient states she feels fine and would like to go home.  I updated her on current conditions and advised against going home.  She has not been compliant with keeping her oxygen on overnight     Assessment & Plan:     Left lower lobe pneumonia  -Continue with Rocephin and azithromycin    UTI  -Continue Rocephin  -Follow cultures    Right rib fracture  -Patient reports minimal pain with coughing  -Tylenol as needed    Acute hypoxic respiratory failure  -Saturations 88% on admission  -Patient refusing to keep oxygen on    Elevated troponin  -Cardiology evaluated, suspecting non-ACS elevation  -Echo ordered and she will need ischemic eval outpatient given coronary calcifications on CT    Hyponatremia  Hypokalemia  ALEKSANDR  -Repleted and improved with fluids  -Continue to monitor    Hyperglycemia  -A1c 5.8    Outisde Records, prior notes, labs, radiology, and medications reviewed     Code status: Full code  DVT prophylaxis: [facility] Problems             Last Modified POA    * (Principal) Acute hypoxic respiratory failure 2024 Yes          Review of Systems:   Pertinent items are noted in HPI.       Vital Signs:    Last 24hrs VS reviewed since prior progress note. Most recent are:  Vitals:    24 1442   BP:    Pulse:    Resp:    Temp:    SpO2: 92%       No intake or output data in the 24 hours ending 24 1515     Physical Examination:

## 2024-12-31 NOTE — PROGRESS NOTES
Physical Therapy    Orders received, chart reviewed and patient evaluated by physical therapy. Pending progression with skilled acute physical therapy, recommend:    No skilled physical therapy    Recommend with nursing OOB to chair 3x/day and walking daily without assistance. Thank you for completing as able in order to maintain patient strength, endurance and independence.     Full evaluation to follow.      Geovany Mercer, PT, DPT

## 2024-12-31 NOTE — PROGRESS NOTES
PHYSICAL THERAPY EVALUATION/DISCHARGE    Patient: Tierney Alcantara (69 y.o. female)  Date: 12/31/2024  Primary Diagnosis: Hypokalemia [E87.6]  Septicemia (HCC) [A41.9]  Elevated troponin [R79.89]  ALEKSANDR (acute kidney injury) (HCC) [N17.9]  Respiratory failure with hypoxia, unspecified chronicity [J96.91]  Pneumonia of left lower lobe due to infectious organism [J18.9]  Cardiomyopathy, unspecified type (HCC) [I42.9]  Acute hypoxic respiratory failure [J96.01]       Precautions: General Precautions                      ASSESSMENT AND RECOMMENDATIONS:  Based on the objective data below, the patient the patient presents with no further acute or post-acute physical therapy needs, s/p admission for SOB, found on chest xray with PNA. Patient is currently mobilizing at baseline level, completing all components with independence unsupported. Presents with moderate non-productive cough, stable vitals. Patient is safe to discharge home once medically stable. Will complete PT order. Please consult if there is a change in status.       Functional Outcome Measure:  The patient scored 24 on the [facility] outcome measure which is indicative of reduced odds of requiring post acute SNF/IPR upon d/c .      Further skilled acute physical therapy is not indicated at this time.       PLAN :  Recommendation for discharge: (in order for the patient to meet his/her long term goals):   No skilled physical therapy    Other factors to consider for discharge: no additional factors    IF patient discharges home will need the following DME: none       SUBJECTIVE:   Patient stated “I worked for [employer].”    OBJECTIVE DATA SUMMARY:     Past Medical History:   Diagnosis Date    Asthma     exercise induced    Breast cancer (HCC)     Left breast 2010 Right breast 2022    Hypertension     Mitral valve prolapse     Pneumonia eosinophilous 01/01/2004    Skin cancer 2022     Past Surgical History:   Procedure Laterality Date    BREAST LUMPECTOMY Right 10/26/2022  personal factors that will impact the outcome / POC LOW Complexity : 1-2 Standardized tests and measures addressing body structure, function, activity limitation and / or participation in recreation  LOW Complexity : Stable, uncomplicated  AM-PAC  LOW      Based on the above components, the patient evaluation is determined to be of the following complexity level: Low

## 2024-12-31 NOTE — ED NOTES
This RN educated patient on the importance of needing to keep her O2 on while sleeping due to her O2 saturations dropping into the upper 80s. Informed the patient that the reason for admission was due to her being hypoxic because of her pneumonia. Patient states that she does not want her body to rely on O2. This RN reiterated the importance of her needing the O2. Provider aware.

## 2024-12-31 NOTE — H&P
History and Physical    Date of Service:  12/30/2024  Primary Care Provider: Jhony Guerra MD  Source of information: Patient, Family, External Medical Records    Chief Complaint: Fever      History of Presenting Illness:   Tierney Alcantara is a very pleasant 69 y.o. female with a past medical history of breast cancer presents to the emergency room due to shortness of breath.    Patient has a history of breast cancer s/p lumpectomy, hypertension, asthma,  who presents to the emergency room complaining of shortness of breath and cough for about 4 days.  She states she has had fevers, chills, cough, and progressively worsening shortness of breath.  Today she had a fever as high as 104.  She went to an urgent care where she was found with an O2 saturation less than 90%, and she was sent to the ER for further evaluation.  COVID and flu test are negative, but chest x-ray demonstrates a community-acquired pneumonia.  Internal medicine was consulted for admission       REVIEW OF SYSTEMS:  A comprehensive review of systems was negative except for that written in the History of Present Illness.     Past Medical History:   Diagnosis Date    Asthma     exercise induced    Breast cancer (HCC)     Left breast 2010 Right breast 2022    Hypertension     Mitral valve prolapse     Pneumonia eosinophilous 01/01/2004    Skin cancer 2022      Past Surgical History:   Procedure Laterality Date    BREAST LUMPECTOMY Right 10/26/2022    RIGHT BREAST LUMPECTOMY AND RIGHT BREAST SENTINEL NODE BIOPSY WITH BLUE DYE performed by Kaitlin Carlson MD at [facility] AMBULATORY OR    BREAST LUMPECTOMY Right 11/16/2022    RE EXCISION RIGHT BREAST LUMPECTOMY performed by Kaitlin Carlson MD at [facility] AMBULATORY OR    BREAST LUMPECTOMY  07/01/2010    LEFT    CATARACT REMOVAL Right     getting left eye done the week of 11/20/22    CYST REMOVAL      Ganglion    HEENT Bilateral     Pterygium    LIPOMA RESECTION  01/01/2000    left leg    MOHS SURGERY  normal limits   POC LACTIC ACID - Abnormal; Notable for the following components:    POC Lactic Acid 2.22 (*)     All other components within normal limits         IMAGING:   XR CHEST PORTABLE   Final Result   No acute intrathoracic process.         Electronically signed by ARDEN SHEN      CT CHEST WO CONTRAST    (Results Pending)   US RETROPERITONEAL COMPLETE    (Results Pending)        ECG/ECHO:  EKG: normal EKG, normal sinus rhythm.       Notes reviewed from all clinical/nonclinical/nursing services involved in patient's clinical care. Care coordination discussions were held with appropriate clinical/nonclinical/ nursing providers based on care coordination needs.     Assessment:   Given the patient's current clinical presentation, there is a high level of concern for decompensation if discharged from the emergency department. Complex decision making was performed, which includes reviewing the patient's available past medical records, laboratory results, and imaging studies.    Principal Problem:    Acute hypoxic respiratory failure  Resolved Problems:    * No resolved hospital problems. *      Plan:     Tierney Alcantara is a very pleasant 69 y.o. female with a past medical history of breast cancer presents to the emergency room due to shortness of breath.    Acute hypoxic respiratory failure 2/2 pneumonia  -- Dyspnea, cough, wheezing  -- Infiltrate on imaging  -- Continue antibiotics-azithromycin and ceftriaxone  -- Consult pulmonary -if no improvement  - Cepacol, Mucinex, Tessalon for symptomatic control  -- Continuous oxygen, wean as tolerated  -- DuoNebs as needed  -- Vital signs per unit routine    Elevated troponin  - Likely due to hypoxic respiratory failure  - Trending down once placed on oxygen  - Get echo  - Get cardiology, may need outpatient stress    Hypokalemia  - Potassium low at 2.9  - Replete with IV potassium  - Telemonitoring no acute EKG  - Repeat renal panel in morning    Acute hyponatremia  -

## 2024-12-31 NOTE — ED NOTES
..ED TO INPATIENT SBAR HANDOFF    Patient Name: Tierney Alcantara   :  1955  69 y.o.   MRN:  [id number]  ED Room #:  ER09/09     Situation  Code Status: Full Code   Allergies: Patient has no known allergies.  Weight: Patient Vitals for the past 96 hrs (Last 3 readings):   Weight   24 1735 65.3 kg (144 lb)       Arrived from: home    Chief Complaint:   Chief Complaint   Patient presents with    Fever       Hospital Problem/Diagnosis:  Principal Problem:    Acute hypoxic respiratory failure  Resolved Problems:    * No resolved hospital problems. *      Mobility: no current mobility problem   ED Fall Risk: Presents to emergency department  because of falls (Syncope, seizure, or loss of consciousness): No, Age > 70: No, Altered Mental Status, Intoxication with alcohol or substance confusion (Disorientation, impaired judgment, poor safety awaremess, or inability to follow instructions): No, Impaired Mobility: Ambulates or transfers with assistive devices or assistance; Unable to ambulate or transer.: No, Nursing Judgement: No   Fell in ED or prior to admission: no   Restraints: no     Sitter: no   Family/Caregiver Present: no    Neet to know social/safety information:     Background  History:   Past Medical History:   Diagnosis Date    Asthma     exercise induced    Breast cancer (HCC)     Left breast  Right breast     Hypertension     Mitral valve prolapse     Pneumonia eosinophilous 2004    Skin cancer        Assessment    Abnormal Assessment Findings:   Imaging:   US RETROPERITONEAL COMPLETE   Final Result   Normal renal ultrasound examination.      Electronically signed by Estevan Watson      CT CHEST WO CONTRAST   Final Result   LLL pneumonia. Right rib fracture.      Electronically signed by PATRICK MURRAY      XR CHEST PORTABLE   Final Result   No acute intrathoracic process.         Electronically signed by RADEN SHEN        Abnormal labs:   Abnormal Labs Reviewed   CBC WITH AUTO  (*)     Monocytes % 3 (*)     Lymphocytes Absolute 0.6 (*)     All other components within normal limits   BASIC METABOLIC PANEL - Abnormal; Notable for the following components:    Chloride 110 (*)     Glucose 146 (*)     BUN/Creatinine Ratio 22 (*)     Calcium 8.0 (*)     All other components within normal limits   TROPONIN - Abnormal; Notable for the following components:    Troponin, High Sensitivity 236 (*)     All other components within normal limits   TSH + FREE T4 PANEL - Abnormal; Notable for the following components:    T4 Free 2.1 (*)     All other components within normal limits   HEMOGLOBIN A1C - Abnormal; Notable for the following components:    Hemoglobin A1C 5.8 (*)     All other components within normal limits       Vitals/MEWS: MEWS Score: 0  Level of Consciousness: Alert (0)   Vitals:    12/31/24 0823 12/31/24 1000 12/31/24 1200 12/31/24 1218   BP: (!) 137/56      Pulse: 94 89 98 90   Resp: 13 16 20 28   Temp: 97.8 °F (36.6 °C)      TempSrc: Oral      SpO2: 94% 91% 92% 92%   Weight:       Height:         DI:   Predictive Model Details          32 (Caution)  Factor Value    Calculated 12/31/2024 12:22 48% Respiratory rate 28    Deterioration Index Model 36% Age 69 years old     5% Pulse oximetry 92 %     4% Systolic 137     3% Sodium 137 mmol/L     2% Pulse 90     2% Platelet count abnormal (131 K/uL)     1% WBC count 7.9 K/uL     1% Hematocrit abnormal (33.9 %)     0% Temperature 97.8 °F (36.6 °C)     0% Potassium 3.9 mmol/L         FiO2 (%):   O2 Flow Rate: O2 Device: None (Room air) O2 Flow Rate (L/min): 3 L/min  Cardiac Rhythm: Cardiac Rhythm: Sinus rhythm    Pain Scale: Pain Assessment  Pain Assessment: None - Denies Pain  Last documented pain score: (0-10 scale)    Last documented pain medication administered:      Mental Status: oriented and alert  Orientation Level:    NIH Score:    C-SSRS: Risk of Suicide: No Risk    PO Status: Regular  Bedside swallow: 3 oz Water Swallow Screen:

## 2024-12-31 NOTE — PROGRESS NOTES
[facility] Lovenox Dosing 12/31/24  Lovenox dose change per protocol  Physician: Dr. Ignacio    [facility] Protocol  Enoxaparin prophylaxis dosing (medically ill, surgical patients)   Patient Weight (kg)     50 and below 51 - 100 101 - 150 151 - 174 175 or greater         Estimated CrCl  (ml/min) 30 or greater   30 mg SUBQ daily   40 mg SUBQ daily (or 30 mg BID for ortho) 30 mg SUBQ BID  40 mg SUBQ BID 60mg SUBQ BID      15-29 UFH 5000 units SUBQ BID   30 mg SUBQ daily 30 mg SUBQ daily 40 mg SUBQ daily   60 mg SUBQ daily      Less than 15 or Dialysis UFH 5000 units SUBQ BID   UFH 5000 units SUBQ TID UFH 7500 units SUBQ TID     Estimated Creatinine Clearance: 53 mL/min (based on SCr of 0.9 mg/dL).  Current dose: 30mg daily  Recommendation: 40mg daily    Thank you

## 2025-01-01 VITALS
HEIGHT: 63 IN | OXYGEN SATURATION: 90 % | DIASTOLIC BLOOD PRESSURE: 77 MMHG | RESPIRATION RATE: 21 BRPM | HEART RATE: 114 BPM | TEMPERATURE: 97.7 F | WEIGHT: 144 LBS | BODY MASS INDEX: 25.52 KG/M2 | SYSTOLIC BLOOD PRESSURE: 152 MMHG

## 2025-01-01 LAB
ANION GAP SERPL CALC-SCNC: 7 MMOL/L (ref 2–12)
BACTERIA SPEC CULT: NORMAL
BASOPHILS # BLD: 0 K/UL (ref 0–0.1)
BASOPHILS NFR BLD: 0 % (ref 0–1)
BUN SERPL-MCNC: 12 MG/DL (ref 6–20)
BUN/CREAT SERPL: 17 (ref 12–20)
CALCIUM SERPL-MCNC: 8.2 MG/DL (ref 8.5–10.1)
CC UR VC: NORMAL
CHLORIDE SERPL-SCNC: 115 MMOL/L (ref 97–108)
CO2 SERPL-SCNC: 22 MMOL/L (ref 21–32)
CREAT SERPL-MCNC: 0.69 MG/DL (ref 0.55–1.02)
DIFFERENTIAL METHOD BLD: ABNORMAL
EOSINOPHIL # BLD: 0 K/UL (ref 0–0.4)
EOSINOPHIL NFR BLD: 0 % (ref 0–7)
ERYTHROCYTE [DISTWIDTH] IN BLOOD BY AUTOMATED COUNT: 12.8 % (ref 11.5–14.5)
GLUCOSE SERPL-MCNC: 116 MG/DL (ref 65–100)
HCT VFR BLD AUTO: 33.5 % (ref 35–47)
HGB BLD-MCNC: 11.3 G/DL (ref 11.5–16)
IMM GRANULOCYTES # BLD AUTO: 0 K/UL
IMM GRANULOCYTES NFR BLD AUTO: 0 %
L PNEUMO1 AG UR QL IA: NEGATIVE
LYMPHOCYTES # BLD: 1.4 K/UL (ref 0.8–3.5)
LYMPHOCYTES NFR BLD: 13 % (ref 12–49)
MCH RBC QN AUTO: 29 PG (ref 26–34)
MCHC RBC AUTO-ENTMCNC: 33.7 G/DL (ref 30–36.5)
MCV RBC AUTO: 86.1 FL (ref 80–99)
MONOCYTES # BLD: 0.6 K/UL (ref 0–1)
MONOCYTES NFR BLD: 5 % (ref 5–13)
NEUTS SEG # BLD: 9 K/UL (ref 1.8–8)
NEUTS SEG NFR BLD: 82 % (ref 32–75)
NRBC # BLD: 0 K/UL (ref 0–0.01)
NRBC BLD-RTO: 0 PER 100 WBC
PHOSPHATE SERPL-MCNC: 1.6 MG/DL (ref 2.6–4.7)
PLATELET # BLD AUTO: 188 K/UL (ref 150–400)
PMV BLD AUTO: 11.8 FL (ref 8.9–12.9)
POTASSIUM SERPL-SCNC: 3.2 MMOL/L (ref 3.5–5.1)
RBC # BLD AUTO: 3.89 M/UL (ref 3.8–5.2)
RBC MORPH BLD: ABNORMAL
SERVICE CMNT-IMP: NORMAL
SODIUM SERPL-SCNC: 144 MMOL/L (ref 136–145)
SPECIMEN SOURCE: NORMAL
WBC # BLD AUTO: 11 K/UL (ref 3.6–11)
WBC MORPH BLD: ABNORMAL

## 2025-01-01 PROCEDURE — 6370000000 HC RX 637 (ALT 250 FOR IP): Performed by: INTERNAL MEDICINE

## 2025-01-01 PROCEDURE — 84100 ASSAY OF PHOSPHORUS: CPT

## 2025-01-01 PROCEDURE — 6370000000 HC RX 637 (ALT 250 FOR IP): Performed by: STUDENT IN AN ORGANIZED HEALTH CARE EDUCATION/TRAINING PROGRAM

## 2025-01-01 PROCEDURE — 85025 COMPLETE CBC W/AUTO DIFF WBC: CPT

## 2025-01-01 PROCEDURE — 94640 AIRWAY INHALATION TREATMENT: CPT

## 2025-01-01 PROCEDURE — 80048 BASIC METABOLIC PNL TOTAL CA: CPT

## 2025-01-01 RX ORDER — BENZONATATE 100 MG/1
100 CAPSULE ORAL 3 TIMES DAILY PRN
Qty: 12 CAPSULE | Refills: 0 | Status: SHIPPED | OUTPATIENT
Start: 2025-01-01 | End: 2025-01-01

## 2025-01-01 RX ORDER — LEVOFLOXACIN 750 MG/1
750 TABLET, FILM COATED ORAL DAILY
Qty: 5 TABLET | Refills: 0 | Status: SHIPPED | OUTPATIENT
Start: 2025-01-01 | End: 2025-01-01

## 2025-01-01 RX ORDER — BENZONATATE 100 MG/1
100 CAPSULE ORAL 3 TIMES DAILY PRN
Qty: 12 CAPSULE | Refills: 0 | Status: SHIPPED | OUTPATIENT
Start: 2025-01-01 | End: 2025-01-08

## 2025-01-01 RX ORDER — PREDNISONE 20 MG/1
40 TABLET ORAL DAILY
Qty: 10 TABLET | Refills: 0 | Status: SHIPPED | OUTPATIENT
Start: 2025-01-01 | End: 2025-01-06

## 2025-01-01 RX ORDER — GUAIFENESIN 600 MG/1
600 TABLET, EXTENDED RELEASE ORAL 2 TIMES DAILY
Qty: 12 TABLET | Refills: 0 | Status: SHIPPED | OUTPATIENT
Start: 2025-01-01 | End: 2025-01-01

## 2025-01-01 RX ORDER — PREDNISONE 20 MG/1
40 TABLET ORAL DAILY
Qty: 10 TABLET | Refills: 0 | Status: SHIPPED | OUTPATIENT
Start: 2025-01-01 | End: 2025-01-01

## 2025-01-01 RX ORDER — ALBUTEROL SULFATE 90 UG/1
2 INHALANT RESPIRATORY (INHALATION) 4 TIMES DAILY PRN
Qty: 18 G | Refills: 0 | Status: SHIPPED | OUTPATIENT
Start: 2025-01-01

## 2025-01-01 RX ORDER — ALBUTEROL SULFATE 90 UG/1
2 INHALANT RESPIRATORY (INHALATION) 4 TIMES DAILY PRN
Qty: 18 G | Refills: 0 | Status: SHIPPED | OUTPATIENT
Start: 2025-01-01 | End: 2025-01-01

## 2025-01-01 RX ORDER — LEVOFLOXACIN 750 MG/1
750 TABLET, FILM COATED ORAL DAILY
Qty: 5 TABLET | Refills: 0 | Status: SHIPPED | OUTPATIENT
Start: 2025-01-01 | End: 2025-01-06

## 2025-01-01 RX ORDER — GUAIFENESIN 600 MG/1
600 TABLET, EXTENDED RELEASE ORAL 2 TIMES DAILY
Qty: 12 TABLET | Refills: 0 | Status: SHIPPED | OUTPATIENT
Start: 2025-01-01

## 2025-01-01 RX ORDER — IPRATROPIUM BROMIDE AND ALBUTEROL SULFATE 2.5; .5 MG/3ML; MG/3ML
1 SOLUTION RESPIRATORY (INHALATION)
Status: DISCONTINUED | OUTPATIENT
Start: 2025-01-01 | End: 2025-01-01 | Stop reason: HOSPADM

## 2025-01-01 RX ADMIN — AMLODIPINE AND VALSARTAN 1 TABLET: 5; 160 TABLET ORAL at 09:46

## 2025-01-01 RX ADMIN — IPRATROPIUM BROMIDE AND ALBUTEROL SULFATE 1 DOSE: 2.5; .5 SOLUTION RESPIRATORY (INHALATION) at 08:04

## 2025-01-01 RX ADMIN — POTASSIUM BICARBONATE 20 MEQ: 782 TABLET, EFFERVESCENT ORAL at 09:46

## 2025-01-01 RX ADMIN — TAMOXIFEN CITRATE 10 MG: 10 TABLET ORAL at 09:58

## 2025-01-01 RX ADMIN — DIBASIC SODIUM PHOSPHATE, MONOBASIC POTASSIUM PHOSPHATE AND MONOBASIC SODIUM PHOSPHATE 2 TABLET: 852; 155; 130 TABLET ORAL at 09:46

## 2025-01-01 RX ADMIN — GUAIFENESIN 600 MG: 600 TABLET ORAL at 09:46

## 2025-01-01 NOTE — DISCHARGE INSTRUCTIONS
HOSPITALIST DISCHARGE INSTRUCTIONS  NAME:  Tierney Alcantara   :  1955   MRN:  [id number]     Date/Time:  2025 9:21 AM    ADMIT DATE: 2024     DISCHARGE DATE: 2025     DISCHARGE DIAGNOSIS:  Pneumonia, urinary tract infection    DISCHARGE INSTRUCTIONS:  Thank you for allowing us to participate in your care. Your discharging Hospitalist is Tianna mejía MD. You were admitted for evaluation and treatment of the above.     Please continue to take your antibiotics. I am going to prescribe levaquin to treat both UTI and PNA. You will take one time per day. Your first dose is today at 6 pm. You will take 5 more doses. I am also going to provide 5 days of prednisone (steroid). I will provide albuterol inhaler, tessalon Perles, and mucinex which can be used as needed for symptoms.     Please contact cardiology for stress test.     Please follow up with primary care doctor.     Prediabetes    Your recent labwork shows an A1c of 5.8%. This is consistent with a diagnosis of \"prediabetes\". An A1c level of 6.5% or above is consistent with diabetes. Without changes in your lifestyle (diet and exercise), you are at high risk for becoming diabetic. Diabetes can lead to chronic health problems and complications, such as: Heart disease, Stroke, Blindness, Kidney disease., Depression, Poor circulation in your feet and legs leading to amputations.     Ways you can decrease your risk:   - Try to eat less of these: refined, highly processed carbohydrate foods and those with added sugar. These include sugary drinks like soda, sweet tea and juice, refined grains like white bread, white rice and sugary cereal, and sweets and snack foods like cake, cookies, candy and chips.  - Drink water throughout the day. Avoid drinks that contain added sugar, such as soda or sweetened tea. Drink enough fluid to keep your urine pale yellow.  - Do physical activity that makes your heart beat faster and makes you sweat (moderate                                                      Physician's or R.N.'s Signature                                                                  Date/Time                                                                                                                                              Patient or Representative Signature                                                          Date/Time

## 2025-01-01 NOTE — PROGRESS NOTES
Chart reviewed.    Awaiting echo.    Given holiday if echo cannot be done inpatient, we can arrange outpatient along with her ischemic evaluation/stress testing.

## 2025-01-01 NOTE — DISCHARGE SUMMARY
Hospitalist Discharge Summary     Patient ID:  Tierney Alcantara  [id number]  69 y.o.  1955    Admit date: 12/30/2024    Discharge date and time: 1/1/2025    Admission Diagnoses: Hypokalemia [E87.6]  Septicemia (HCC) [A41.9]  Elevated troponin [R79.89]  ALEKSANDR (acute kidney injury) (HCC) [N17.9]  Respiratory failure with hypoxia, unspecified chronicity [J96.91]  Pneumonia of left lower lobe due to infectious organism [J18.9]  Cardiomyopathy, unspecified type (HCC) [I42.9]  Acute hypoxic respiratory failure [J96.01]    Discharge Diagnoses:    Principal Problem:    Acute hypoxic respiratory failure  Resolved Problems:    * No resolved hospital problems. *         Hospital Course:       Tierney Alcantara is a very pleasant 69 y.o. female with a past medical history of breast cancer presented 12/30  due to shortness of breath.     Left lower lobe pneumonia - POA  Acute hypoxic respiratory failure- POA, resolved on RA   - Placed on 2 L NC on admission. PNA seen on CT chest. Not septic, with no fever and normal WBC.   - Good sats on RA   - H/o eosinophilic PNA in 2004. Normal eosinophils on peripheral smear. Imaging without diffuse pulmonary opacities as would be expected in eosinophilic PNA. She does not want to stay in hospital for pulmonology eval. Will provide 5 days pulse dose steroids. Close OP follow up with pcp   - Plan to DC on Levaquin x 7 day course for both UTI/PNA treatment   - Blood culture NGTD  - PT/OT > no skilled needs     UTI  - UA infected. Urine culture GNR. Blood culture NGTD  - Levaquin as above      ALEKSANDR  - Cr 2.19 on admission  - Resolved with IVF, 0.9 prior to DC.     Hyponatremia  Hypokalemia  Hypophosphatemia   - Due to ALEKSANDR, PNA  - S/p IVF.   - Replete prior to DC      Right rib fracture  - Seen on CT chest. Pain with couging   - Tylenol as needed     Nonischemic cardiac injury, elevated trop  CAD  - Trop flat. CT showed Coronary artery calcium. Lipid panel with LDL 68  - Cardiology evaluated,

## 2025-01-03 LAB
M PNEUMO IGG SER IA-ACNC: 706 U/ML (ref 0–99)
M PNEUMO IGM SER IA-ACNC: <770 U/ML (ref 0–769)

## 2025-01-05 LAB
BACTERIA SPEC CULT: NORMAL
BACTERIA SPEC CULT: NORMAL
SERVICE CMNT-IMP: NORMAL
SERVICE CMNT-IMP: NORMAL

## 2025-01-13 ENCOUNTER — TELEPHONE (OUTPATIENT)
Age: 70
End: 2025-01-13

## (undated) DEVICE — CANISTER, RIGID, 3000CC: Brand: MEDLINE INDUSTRIES, INC.

## (undated) DEVICE — BLUNTFILL WITH FILTER: Brand: MONOJECT

## (undated) DEVICE — CHEST PACK-SFMCASU: Brand: MEDLINE INDUSTRIES, INC.

## (undated) DEVICE — COVER US PRB W15XL120CM W/ GEL RUBBERBAND TAPE STRP FLD GEN

## (undated) DEVICE — SOL IRRIGATION INJ NACL 0.9% 500ML BTL

## (undated) DEVICE — GLOVE SURG SZ 65 THK91MIL LTX FREE SYN POLYISOPRENE

## (undated) DEVICE — ROCKER SWITCH PENCIL BLADE ELECTRODE, HOLSTER: Brand: EDGE

## (undated) DEVICE — SUT SLK 2-0SH 30IN BLK --

## (undated) DEVICE — HYPODERMIC SAFETY NEEDLE: Brand: MONOJECT

## (undated) DEVICE — SOL INJ SOD CL0.9% 10ML PREFIL -- SUB B-D306546Z 30/BX $6.00